# Patient Record
Sex: MALE | Race: WHITE | Employment: FULL TIME | URBAN - METROPOLITAN AREA
[De-identification: names, ages, dates, MRNs, and addresses within clinical notes are randomized per-mention and may not be internally consistent; named-entity substitution may affect disease eponyms.]

---

## 2018-06-26 ENCOUNTER — APPOINTMENT (OUTPATIENT)
Dept: LAB | Facility: REFERENCE LAB | Age: 33
End: 2018-06-26
Attending: FAMILY MEDICINE
Payer: COMMERCIAL

## 2018-06-26 ENCOUNTER — OFFICE VISIT (OUTPATIENT)
Dept: FAMILY MEDICINE CLINIC | Facility: CLINIC | Age: 33
End: 2018-06-26

## 2018-06-26 VITALS
SYSTOLIC BLOOD PRESSURE: 132 MMHG | OXYGEN SATURATION: 98 % | DIASTOLIC BLOOD PRESSURE: 86 MMHG | BODY MASS INDEX: 33.18 KG/M2 | WEIGHT: 245 LBS | HEIGHT: 72 IN | HEART RATE: 72 BPM

## 2018-06-26 DIAGNOSIS — Z00.01 ENCOUNTER FOR ROUTINE ADULT HEALTH EXAMINATION WITH ABNORMAL FINDINGS: Primary | ICD-10-CM

## 2018-06-26 DIAGNOSIS — Z00.01 ENCOUNTER FOR ROUTINE ADULT HEALTH EXAMINATION WITH ABNORMAL FINDINGS: ICD-10-CM

## 2018-06-26 DIAGNOSIS — I10 ESSENTIAL HYPERTENSION: ICD-10-CM

## 2018-06-26 DIAGNOSIS — G25.81 RESTLESS LEG SYNDROME: ICD-10-CM

## 2018-06-26 DIAGNOSIS — Z23 NEED FOR VACCINATION: ICD-10-CM

## 2018-06-26 DIAGNOSIS — F41.9 ANXIETY: ICD-10-CM

## 2018-06-26 PROCEDURE — 80053 COMPREHEN METABOLIC PANEL: CPT | Performed by: FAMILY MEDICINE

## 2018-06-26 PROCEDURE — 99385 PREV VISIT NEW AGE 18-39: CPT | Performed by: FAMILY MEDICINE

## 2018-06-26 RX ORDER — LISINOPRIL 20 MG/1
20 TABLET ORAL DAILY
Qty: 90 TABLET | Refills: 0 | Status: SHIPPED | OUTPATIENT
Start: 2018-06-26 | End: 2018-07-31

## 2018-06-26 RX ORDER — LISINOPRIL 20 MG/1
20 TABLET ORAL DAILY
Qty: 90 TABLET | Refills: 0 | Status: SHIPPED | OUTPATIENT
Start: 2018-06-26 | End: 2018-06-26

## 2018-06-26 NOTE — PATIENT INSTRUCTIONS
Eating Heart-Healthy Foods  Eating has a big impact on your heart health. In fact, eating healthier can improve several of your heart risks at once. For instance, it helps you manage weight, cholesterol, and blood pressure.  Here are ideas to help you mahendra Aim to make these foods staples of your diet. If you have diabetes, you may have different recommendations than what is listed here:  · Fruits and vegetables provide plenty of nutrients without a lot of calories.  At meals, fill half your plate with these f · Choose ingredients that spice up your food without adding calories, fat, or sodium. Try these items: horseradish, hot sauce, lemon, mustard, nonfat salad dressings, and vinegar.  For salt-free herbs and spices, try basil, cilantro, cinnamon, pepper, and r Vision All men in this age group Every 5 to 8 years if no risk factors for eye disease   Vaccines Who needs it How often   Chickenpox (varicella) All men in this age group who have no record of this infection or vaccine 2 doses; the second dose should be Use of tobacco and the health effects it can cause All men in this age group Every visit   Sexually transmitted infection prevention Men who are sexually active At routine exams   Skin cancer Prevention of skin cancer in fair-skinned adults through age 25

## 2018-06-26 NOTE — PROGRESS NOTES
Arya Ram is a 27year old male who presents for a complete physical exam.   HPI:     For the past couple of weeks he has taken his Lisinopril only 3-4 times and notes he would like to try to wean off of it.    Started blood pressure medication a yea pertinent surgical history.    Family History   Problem Relation Age of Onset   • Hypertension Father       Social History:  Smoking status: Former Smoker                                                              Packs/day: 0.00      Years: 0.00 examination with abnormal findings  (primary encounter diagnosis)  Essential hypertension  Restless leg syndrome  Anxiety  Need for vaccination    Orders Placed This Encounter      Comp Metabolic Panel (14) [E]      Lipid Panel [E]      CBC W Differential BP follow-up, 1 year for annual physical or sooner as needed.      Chad Lung, DO  06/26/18   12:48 PM

## 2018-07-31 ENCOUNTER — OFFICE VISIT (OUTPATIENT)
Dept: FAMILY MEDICINE CLINIC | Facility: CLINIC | Age: 33
End: 2018-07-31
Payer: COMMERCIAL

## 2018-07-31 ENCOUNTER — APPOINTMENT (OUTPATIENT)
Dept: LAB | Facility: REFERENCE LAB | Age: 33
End: 2018-07-31
Attending: FAMILY MEDICINE
Payer: COMMERCIAL

## 2018-07-31 VITALS
HEIGHT: 72 IN | DIASTOLIC BLOOD PRESSURE: 72 MMHG | WEIGHT: 246 LBS | SYSTOLIC BLOOD PRESSURE: 120 MMHG | OXYGEN SATURATION: 98 % | HEART RATE: 64 BPM | BODY MASS INDEX: 33.32 KG/M2

## 2018-07-31 DIAGNOSIS — R06.83 SNORING: ICD-10-CM

## 2018-07-31 DIAGNOSIS — E78.2 MIXED HYPERLIPIDEMIA: ICD-10-CM

## 2018-07-31 DIAGNOSIS — I10 ESSENTIAL HYPERTENSION: Primary | ICD-10-CM

## 2018-07-31 LAB
CHOLEST SERPL-MCNC: 247 MG/DL (ref 110–200)
HDLC SERPL-MCNC: 44 MG/DL
LDLC SERPL CALC-MCNC: 184 MG/DL (ref 0–99)
NONHDLC SERPL-MCNC: 203 MG/DL
TRIGL SERPL-MCNC: 93 MG/DL (ref 1–149)

## 2018-07-31 PROCEDURE — 80061 LIPID PANEL: CPT | Performed by: FAMILY MEDICINE

## 2018-07-31 PROCEDURE — 36415 COLL VENOUS BLD VENIPUNCTURE: CPT | Performed by: FAMILY MEDICINE

## 2018-07-31 PROCEDURE — 99213 OFFICE O/P EST LOW 20 MIN: CPT | Performed by: FAMILY MEDICINE

## 2018-07-31 RX ORDER — LISINOPRIL 20 MG/1
10 TABLET ORAL DAILY
Qty: 45 TABLET | Refills: 0 | COMMUNITY
Start: 2018-07-31 | End: 2018-12-04 | Stop reason: DRUGHIGH

## 2018-07-31 NOTE — PROGRESS NOTES
CC:  Patient presents with: Follow - Up: on blood pressure      HPI: 27year old male here to follow-up on blood pressure and HLD. Has taken Lisinopril 20 mg daily for the past month. Trying to eat more salads and eating leaner meats like chicken.   Monica Readings from Last 6 Encounters:  07/31/18 : 246 lb  06/26/18 : 245 lb  07/22/16 : 264 lb 6 oz  06/16/16 : 272 lb  08/26/08 : 269 lb 12.8 oz  06/26/07 : 256 lb      Body mass index is 33.36 kg/m².     Physical:  General:  Alert, appropriate, no acute distre

## 2018-11-20 RX ORDER — LISINOPRIL 20 MG/1
TABLET ORAL
Qty: 90 TABLET | Refills: 0 | OUTPATIENT
Start: 2018-11-20

## 2018-11-20 RX ORDER — LISINOPRIL 10 MG/1
10 TABLET ORAL DAILY
Qty: 30 TABLET | Refills: 0 | Status: SHIPPED | OUTPATIENT
Start: 2018-11-20 | End: 2018-12-04

## 2018-11-20 NOTE — TELEPHONE ENCOUNTER
Per pt, he continues to take Lisinopril 10mg daily. [facility] in [facility] [place] as pt has moved and scheduled pt for f/u appt o 12/4/18.

## 2018-12-04 ENCOUNTER — APPOINTMENT (OUTPATIENT)
Dept: LAB | Facility: REFERENCE LAB | Age: 33
End: 2018-12-04
Attending: FAMILY MEDICINE
Payer: COMMERCIAL

## 2018-12-04 ENCOUNTER — OFFICE VISIT (OUTPATIENT)
Dept: FAMILY MEDICINE CLINIC | Facility: CLINIC | Age: 33
End: 2018-12-04
Payer: COMMERCIAL

## 2018-12-04 VITALS
HEART RATE: 70 BPM | BODY MASS INDEX: 31.69 KG/M2 | DIASTOLIC BLOOD PRESSURE: 64 MMHG | HEIGHT: 72 IN | WEIGHT: 234 LBS | SYSTOLIC BLOOD PRESSURE: 120 MMHG | OXYGEN SATURATION: 98 %

## 2018-12-04 DIAGNOSIS — I10 ESSENTIAL HYPERTENSION: ICD-10-CM

## 2018-12-04 DIAGNOSIS — E78.2 MIXED HYPERLIPIDEMIA: Primary | ICD-10-CM

## 2018-12-04 DIAGNOSIS — Z23 NEED FOR VACCINATION: ICD-10-CM

## 2018-12-04 DIAGNOSIS — E78.2 MIXED HYPERLIPIDEMIA: ICD-10-CM

## 2018-12-04 PROCEDURE — 80061 LIPID PANEL: CPT

## 2018-12-04 PROCEDURE — 99214 OFFICE O/P EST MOD 30 MIN: CPT | Performed by: FAMILY MEDICINE

## 2018-12-04 PROCEDURE — 90686 IIV4 VACC NO PRSV 0.5 ML IM: CPT | Performed by: FAMILY MEDICINE

## 2018-12-04 PROCEDURE — 36415 COLL VENOUS BLD VENIPUNCTURE: CPT

## 2018-12-04 PROCEDURE — 90471 IMMUNIZATION ADMIN: CPT | Performed by: FAMILY MEDICINE

## 2018-12-04 RX ORDER — LISINOPRIL 10 MG/1
10 TABLET ORAL DAILY
Qty: 90 TABLET | Refills: 1 | Status: SHIPPED | OUTPATIENT
Start: 2018-12-04 | End: 2019-07-15

## 2018-12-04 NOTE — PROGRESS NOTES
CC:  Patient presents with: Follow - Up: on blood pressure  and cholesterol      HPI: 26year old male here to follow-up on BP and HLD. Has continued to eat smaller portions to help with the weight loss.   Checks his blood pressure occasionally at home an Substance and Sexual Activity      Alcohol use:  Yes        Alcohol/week: 0.0 oz        Comment: ocassionally       Drug use: No      Sexual activity: Not on file    Other Topics      Concerns:        Caffeine Concern: No        Exercise: Yes        Seat B vaccination    - FLULAVAL INFLUENZA VACCINE QUAD PRESERVATIVE FREE 0.5 ML      Bonita Molina, DO  12/04/18  9:18 AM

## 2019-01-09 ENCOUNTER — APPOINTMENT (OUTPATIENT)
Dept: LAB | Facility: REFERENCE LAB | Age: 34
End: 2019-01-09
Attending: FAMILY MEDICINE
Payer: COMMERCIAL

## 2019-01-09 DIAGNOSIS — E78.2 MIXED HYPERLIPIDEMIA: ICD-10-CM

## 2019-01-09 LAB
CHOLEST SERPL-MCNC: 219 MG/DL (ref 110–200)
HDLC SERPL-MCNC: 40 MG/DL
LDLC SERPL CALC-MCNC: 163 MG/DL (ref 0–99)
NONHDLC SERPL-MCNC: 179 MG/DL
TRIGL SERPL-MCNC: 79 MG/DL (ref 1–149)

## 2019-01-09 PROCEDURE — 36415 COLL VENOUS BLD VENIPUNCTURE: CPT

## 2019-01-09 PROCEDURE — 80061 LIPID PANEL: CPT

## 2019-04-19 ENCOUNTER — HOSPITAL ENCOUNTER (OUTPATIENT)
Age: 34
Discharge: HOME | End: 2019-04-19
Payer: COMMERCIAL

## 2019-04-19 VITALS
WEIGHT: 240 LBS | BODY MASS INDEX: 32.51 KG/M2 | OXYGEN SATURATION: 98 % | RESPIRATION RATE: 20 BRPM | HEIGHT: 72 IN | SYSTOLIC BLOOD PRESSURE: 126 MMHG | HEART RATE: 82 BPM | DIASTOLIC BLOOD PRESSURE: 77 MMHG | TEMPERATURE: 98 F

## 2019-04-19 DIAGNOSIS — J02.0 STREPTOCOCCAL SORE THROAT: Primary | ICD-10-CM

## 2019-04-19 PROCEDURE — 99213 OFFICE O/P EST LOW 20 MIN: CPT

## 2019-04-19 PROCEDURE — 99204 OFFICE O/P NEW MOD 45 MIN: CPT

## 2019-04-19 PROCEDURE — 87430 STREP A AG IA: CPT

## 2019-04-19 RX ORDER — AMOXICILLIN 875 MG/1
875 TABLET, COATED ORAL 2 TIMES DAILY
Qty: 20 TABLET | Refills: 0 | Status: SHIPPED | OUTPATIENT
Start: 2019-04-19 | End: 2019-04-29

## 2019-04-19 NOTE — ED PROVIDER NOTES
Patient presents with:  Sore Throat      HPI:     Suni العراقي is a 26year old male who presents for evaluation of a chief complaint of sore throat, dry cough, chills, and body aches for the past 2 days. No difficulty swallowing. Speech is clear.   M[place] Relationship status: Not on file      Intimate partner violence:        Fear of current or ex partner: Not on file        Emotionally abused: Not on file        Physically abused: Not on file        Forced sexual activity: Not on file    Other Topics care and follow-up closely with his primary care doctor. Diagnosis:    ICD-10-CM    1. Streptococcal sore throat J02.0        All results reviewed and discussed with patient. See AVS for detailed discharge instructions for your condition today.     Dori Kehr

## 2019-04-19 NOTE — ED INITIAL ASSESSMENT (HPI)
PATIENT ARRIVED AMBULATORY TO ROOM C/O SYMPTOMS X2 DAYS. +SORE THROAT. +CONGESTED COUGH. NO FEVERS. NO N/V/D. EASY NON LABORED RESPIRATIONS.

## 2019-07-15 RX ORDER — LISINOPRIL 10 MG/1
TABLET ORAL
Qty: 90 TABLET | Refills: 1 | Status: SHIPPED | OUTPATIENT
Start: 2019-07-15 | End: 2020-01-07

## 2020-01-07 ENCOUNTER — LAB ENCOUNTER (OUTPATIENT)
Dept: LAB | Facility: REFERENCE LAB | Age: 35
End: 2020-01-07
Attending: FAMILY MEDICINE
Payer: COMMERCIAL

## 2020-01-07 ENCOUNTER — OFFICE VISIT (OUTPATIENT)
Dept: FAMILY MEDICINE CLINIC | Facility: CLINIC | Age: 35
End: 2020-01-07
Payer: COMMERCIAL

## 2020-01-07 VITALS
DIASTOLIC BLOOD PRESSURE: 80 MMHG | SYSTOLIC BLOOD PRESSURE: 122 MMHG | OXYGEN SATURATION: 98 % | HEART RATE: 84 BPM | WEIGHT: 250 LBS | BODY MASS INDEX: 33.86 KG/M2 | HEIGHT: 72 IN

## 2020-01-07 DIAGNOSIS — Z00.01 ENCOUNTER FOR ROUTINE ADULT HEALTH EXAMINATION WITH ABNORMAL FINDINGS: Primary | ICD-10-CM

## 2020-01-07 DIAGNOSIS — I10 ESSENTIAL HYPERTENSION: ICD-10-CM

## 2020-01-07 DIAGNOSIS — H93.8X2 EAR PRESSURE, LEFT: ICD-10-CM

## 2020-01-07 DIAGNOSIS — Z23 NEED FOR VACCINATION: ICD-10-CM

## 2020-01-07 DIAGNOSIS — Z00.01 ENCOUNTER FOR ROUTINE ADULT HEALTH EXAMINATION WITH ABNORMAL FINDINGS: ICD-10-CM

## 2020-01-07 DIAGNOSIS — E78.2 MIXED HYPERLIPIDEMIA: ICD-10-CM

## 2020-01-07 LAB
ALBUMIN SERPL-MCNC: 3.8 G/DL (ref 3.4–5)
ALBUMIN/GLOB SERPL: 1 {RATIO} (ref 1–2)
ALP LIVER SERPL-CCNC: 66 U/L (ref 45–117)
ALT SERPL-CCNC: 34 U/L (ref 16–61)
ANION GAP SERPL CALC-SCNC: 5 MMOL/L (ref 0–18)
AST SERPL-CCNC: 27 U/L (ref 15–37)
BASOPHILS # BLD AUTO: 0.06 X10(3) UL (ref 0–0.2)
BASOPHILS NFR BLD AUTO: 1.1 %
BILIRUB SERPL-MCNC: 0.3 MG/DL (ref 0.1–2)
BUN BLD-MCNC: 24 MG/DL (ref 7–18)
BUN/CREAT SERPL: 17.5 (ref 10–20)
CALCIUM BLD-MCNC: 8.9 MG/DL (ref 8.5–10.1)
CHLORIDE SERPL-SCNC: 112 MMOL/L (ref 98–112)
CHOLEST SMN-MCNC: 225 MG/DL (ref ?–200)
CO2 SERPL-SCNC: 25 MMOL/L (ref 21–32)
CREAT BLD-MCNC: 1.37 MG/DL (ref 0.7–1.3)
DEPRECATED RDW RBC AUTO: 42.5 FL (ref 35.1–46.3)
EOSINOPHIL # BLD AUTO: 0.44 X10(3) UL (ref 0–0.7)
EOSINOPHIL NFR BLD AUTO: 7.7 %
ERYTHROCYTE [DISTWIDTH] IN BLOOD BY AUTOMATED COUNT: 12.6 % (ref 11–15)
EST. AVERAGE GLUCOSE BLD GHB EST-MCNC: 108 MG/DL (ref 68–126)
GLOBULIN PLAS-MCNC: 3.7 G/DL (ref 2.8–4.4)
GLUCOSE BLD-MCNC: 96 MG/DL (ref 70–99)
HBA1C MFR BLD HPLC: 5.4 % (ref ?–5.7)
HCT VFR BLD AUTO: 41 % (ref 39–53)
HDLC SERPL-MCNC: 40 MG/DL (ref 40–59)
HGB BLD-MCNC: 14.3 G/DL (ref 13–17.5)
IMM GRANULOCYTES # BLD AUTO: 0.02 X10(3) UL (ref 0–1)
IMM GRANULOCYTES NFR BLD: 0.4 %
LDLC SERPL CALC-MCNC: 172 MG/DL (ref ?–100)
LYMPHOCYTES # BLD AUTO: 1.89 X10(3) UL (ref 1–4)
LYMPHOCYTES NFR BLD AUTO: 33.2 %
M PROTEIN MFR SERPL ELPH: 7.5 G/DL (ref 6.4–8.2)
MCH RBC QN AUTO: 32 PG (ref 26–34)
MCHC RBC AUTO-ENTMCNC: 34.9 G/DL (ref 31–37)
MCV RBC AUTO: 91.7 FL (ref 80–100)
MONOCYTES # BLD AUTO: 0.47 X10(3) UL (ref 0.1–1)
MONOCYTES NFR BLD AUTO: 8.2 %
NEUTROPHILS # BLD AUTO: 2.82 X10 (3) UL (ref 1.5–7.7)
NEUTROPHILS # BLD AUTO: 2.82 X10(3) UL (ref 1.5–7.7)
NEUTROPHILS NFR BLD AUTO: 49.4 %
NONHDLC SERPL-MCNC: 185 MG/DL (ref ?–130)
OSMOLALITY SERPL CALC.SUM OF ELEC: 298 MOSM/KG (ref 275–295)
PATIENT FASTING Y/N/NP: YES
PATIENT FASTING Y/N/NP: YES
PLATELET # BLD AUTO: 262 10(3)UL (ref 150–450)
POTASSIUM SERPL-SCNC: 4.4 MMOL/L (ref 3.5–5.1)
RBC # BLD AUTO: 4.47 X10(6)UL (ref 4.3–5.7)
SODIUM SERPL-SCNC: 142 MMOL/L (ref 136–145)
TRIGL SERPL-MCNC: 65 MG/DL (ref 30–149)
VLDLC SERPL CALC-MCNC: 13 MG/DL (ref 0–30)
WBC # BLD AUTO: 5.7 X10(3) UL (ref 4–11)

## 2020-01-07 PROCEDURE — 99213 OFFICE O/P EST LOW 20 MIN: CPT | Performed by: FAMILY MEDICINE

## 2020-01-07 PROCEDURE — 80061 LIPID PANEL: CPT

## 2020-01-07 PROCEDURE — 36415 COLL VENOUS BLD VENIPUNCTURE: CPT

## 2020-01-07 PROCEDURE — 99395 PREV VISIT EST AGE 18-39: CPT | Performed by: FAMILY MEDICINE

## 2020-01-07 PROCEDURE — 85025 COMPLETE CBC W/AUTO DIFF WBC: CPT

## 2020-01-07 PROCEDURE — 90686 IIV4 VACC NO PRSV 0.5 ML IM: CPT | Performed by: FAMILY MEDICINE

## 2020-01-07 PROCEDURE — 80053 COMPREHEN METABOLIC PANEL: CPT

## 2020-01-07 PROCEDURE — 83036 HEMOGLOBIN GLYCOSYLATED A1C: CPT

## 2020-01-07 PROCEDURE — 90471 IMMUNIZATION ADMIN: CPT | Performed by: FAMILY MEDICINE

## 2020-01-07 RX ORDER — LISINOPRIL 10 MG/1
10 TABLET ORAL
Qty: 90 TABLET | Refills: 1 | Status: SHIPPED | OUTPATIENT
Start: 2020-01-07 | End: 2020-10-08

## 2020-01-07 NOTE — PATIENT INSTRUCTIONS
-Consider Omega 3 supplement 1,000 mg 1-2 times a day or red yeast rice extract to help lower cholesterol      Prevention Guidelines, Men Ages 25 to 44  Screening tests and vaccines are an important part of managing your health.  A screening test is done to provider   Vision All men in this age group Every 11 to 8 years if no risk factors for eye disease   Vaccines Who needs it How often   Chickenpox (varicella) All men in this age group who have no record of this infection or vaccine 2 doses; the second dose effects it can cause All men in this age group Every visit   Sexually transmitted infection prevention Men who are sexually active At routine exams   Skin cancer Prevention of skin cancer in fair-skinned adults through age 25 At routine exams   1Those who (lamb, ham, beef)  · Many pastries, cakes, cookies, and candies  · Cream, ice cream, sour cream, cheese, and butter, and foods made with them  · Sauces made with butter or cream  · Salad dressings with saturated fats  · Foods that contain palm or coconut o food labels helps you make healthy choices. Look for the words highlighted below. · Serving Size. This is the amount of food in 1 serving. If you eat larger portions, be sure to count more of everything: fat, calories, and cholesterol.   · Total Fat. Tells

## 2020-01-07 NOTE — PROGRESS NOTES
Bobby Garcia is a 37year old male who presents for a complete physical exam.   HPI:   Patient presents with:   Follow - Up: cholesterol and blood pressure  Ear Problem: feels pressure on his left ear when he bites down    Taking Lisinopril most days of Diagnosis Date   • Essential hypertension    • History of high cholesterol    • Metabolic disorder    • Prediabetes       History reviewed. No pertinent surgical history.    Family History   Problem Relation Age of Onset   • Hypertension Father       Soci Value   08/26/2008 39     AST (U/L)   Date Value   06/26/2018 27   06/17/2016 23     ALT (SGPT) (IU/L)   Date Value   08/26/2008 71 (H)     ALT (U/L)   Date Value   06/26/2018 26   06/17/2016 43      ASSESSMENT AND PLAN:   Kathy Aguirre is a 37year old Refills for this Visit:  Requested Prescriptions     Signed Prescriptions Disp Refills   • lisinopril 10 MG Oral Tab 90 tablet 1     Sig: Take 1 tablet (10 mg total) by mouth once daily.        Imaging & Consults:  FLULAVAL INFLUENZA VACCINE QUAD PRESERVATI

## 2020-06-18 ENCOUNTER — APPOINTMENT (OUTPATIENT)
Dept: LAB | Age: 35
End: 2020-06-18
Attending: FAMILY MEDICINE
Payer: COMMERCIAL

## 2020-06-18 DIAGNOSIS — E78.2 MIXED HYPERLIPIDEMIA: ICD-10-CM

## 2020-06-18 PROCEDURE — 36415 COLL VENOUS BLD VENIPUNCTURE: CPT

## 2020-06-18 PROCEDURE — 80061 LIPID PANEL: CPT

## 2020-07-28 ENCOUNTER — HOSPITAL ENCOUNTER (OUTPATIENT)
Age: 35
Discharge: HOME | End: 2020-07-28
Payer: COMMERCIAL

## 2020-07-28 VITALS
TEMPERATURE: 98 F | RESPIRATION RATE: 16 BRPM | SYSTOLIC BLOOD PRESSURE: 124 MMHG | DIASTOLIC BLOOD PRESSURE: 81 MMHG | BODY MASS INDEX: 33.46 KG/M2 | HEIGHT: 72 IN | OXYGEN SATURATION: 100 % | WEIGHT: 247 LBS | HEART RATE: 78 BPM

## 2020-07-28 DIAGNOSIS — Z20.822 SUSPECTED COVID-19 VIRUS INFECTION: Primary | ICD-10-CM

## 2020-07-28 LAB — S PYO AG THROAT QL: NEGATIVE

## 2020-07-28 PROCEDURE — 87430 STREP A AG IA: CPT | Performed by: PHYSICIAN ASSISTANT

## 2020-07-28 PROCEDURE — 99202 OFFICE O/P NEW SF 15 MIN: CPT | Performed by: PHYSICIAN ASSISTANT

## 2020-07-28 PROCEDURE — U0003 INFECTIOUS AGENT DETECTION BY NUCLEIC ACID (DNA OR RNA); SEVERE ACUTE RESPIRATORY SYNDROME CORONAVIRUS 2 (SARS-COV-2) (CORONAVIRUS DISEASE [COVID-19]), AMPLIFIED PROBE TECHNIQUE, MAKING USE OF HIGH THROUGHPUT TECHNOLOGIES AS DESCRIBED BY CMS-2020-01-R: HCPCS | Performed by: PHYSICIAN ASSISTANT

## 2020-07-28 NOTE — ED PROVIDER NOTES
Patient Seen in: [address]      History   Patient presents with:  Sore Throat  Cough/URI  Testing    Stated Complaint: sore throat, cough    HPI    38 yo male with PMH of HTN here for sore throat, congestion, intermitte present. No oropharyngeal exudate. Eyes:      Extraocular Movements: Extraocular movements intact. Pupils: Pupils are equal, round, and reactive to light. Neck:      Musculoskeletal: Normal range of motion.    Cardiovascular:      Rate and Rhythm:

## 2020-07-28 NOTE — ED INITIAL ASSESSMENT (HPI)
Patient states having sore throat, chest congestion, dry cough, increased fatigue x several days./  Patient denies fever, denies n/v/d.

## 2020-07-30 ENCOUNTER — APPOINTMENT (OUTPATIENT)
Dept: GENERAL RADIOLOGY | Age: 35
End: 2020-07-30
Attending: PHYSICIAN ASSISTANT
Payer: COMMERCIAL

## 2020-07-30 ENCOUNTER — HOSPITAL ENCOUNTER (OUTPATIENT)
Age: 35
Discharge: HOME | End: 2020-07-30
Payer: COMMERCIAL

## 2020-07-30 VITALS
DIASTOLIC BLOOD PRESSURE: 81 MMHG | BODY MASS INDEX: 30.71 KG/M2 | WEIGHT: 247 LBS | TEMPERATURE: 98 F | OXYGEN SATURATION: 100 % | HEIGHT: 75 IN | RESPIRATION RATE: 16 BRPM | SYSTOLIC BLOOD PRESSURE: 145 MMHG | HEART RATE: 71 BPM

## 2020-07-30 DIAGNOSIS — R06.02 SOB (SHORTNESS OF BREATH): Primary | ICD-10-CM

## 2020-07-30 LAB — SARS-COV-2 RNA RESP QL NAA+PROBE: NOT DETECTED

## 2020-07-30 PROCEDURE — 71046 X-RAY EXAM CHEST 2 VIEWS: CPT | Performed by: PHYSICIAN ASSISTANT

## 2020-07-30 PROCEDURE — 99213 OFFICE O/P EST LOW 20 MIN: CPT | Performed by: PHYSICIAN ASSISTANT

## 2020-07-30 NOTE — ED NOTES
Pt refused repeat Covid-19 test, fearful of insurance not paying for the test when he has not received the results yet from the first test.

## 2020-07-30 NOTE — ED PROVIDER NOTES
Patient Seen in: [address]      History   Patient presents with:  Dyspnea ANA SOB    Stated Complaint: testing    HPI    36 yo male with PMH of HTN here for evaluation of SOB, cough.   Pt was seen 2 days ago for similar Mouth/Throat:      Mouth: Mucous membranes are moist.   Eyes:      Extraocular Movements: Extraocular movements intact. Pupils: Pupils are equal, round, and reactive to light. Neck:      Musculoskeletal: Normal range of motion.    Cardiovascular: spondylosis. OTHER: Negative.                          =====    CONCLUSION:     1.  No acute cardiopulmonary disease                   Dictated by (CST): Harvey Yip MD on 7/30/2020 at 3:58 PM         Finalized by (CST): Harvey Yip,

## 2020-07-30 NOTE — ED INITIAL ASSESSMENT (HPI)
Pt states that he has had a cough and shortness of breath for 2 days along with a sore throat. Pt was seen in Immediate Care 2 days ago when symptoms started and had a Covid-19 test done and is still awaiting results.  Pt says his sore throat has resolved,

## 2020-10-08 ENCOUNTER — TELEPHONE (OUTPATIENT)
Dept: FAMILY MEDICINE CLINIC | Facility: CLINIC | Age: 35
End: 2020-10-08

## 2020-10-08 RX ORDER — LISINOPRIL 10 MG/1
10 TABLET ORAL
Qty: 90 TABLET | Refills: 0 | Status: SHIPPED | OUTPATIENT
Start: 2020-10-08 | End: 2020-11-09

## 2020-10-08 NOTE — TELEPHONE ENCOUNTER
Med Rf after confirming with pt and pt scheduled with [facility] on 11/9/2020 for a BP f/u. Pt states BPs have been normal again (refer to mychart msg from 9/22/2020). Pt verbalized understanding and agrees with POC.      LOV with AS was 1/7/2020: Return in about 3

## 2020-10-08 NOTE — TELEPHONE ENCOUNTER
Patient requesting an refill on Lisinopril will like to be sent to [facility] in [place]  at [address]   Tel: [phone]

## 2020-10-31 ENCOUNTER — HOSPITAL ENCOUNTER (EMERGENCY)
Age: 35
Discharge: HOME | End: 2020-10-31
Attending: EMERGENCY MEDICINE
Payer: COMMERCIAL

## 2020-10-31 VITALS
TEMPERATURE: 97 F | WEIGHT: 250 LBS | HEART RATE: 77 BPM | BODY MASS INDEX: 33.86 KG/M2 | HEIGHT: 72 IN | OXYGEN SATURATION: 97 % | SYSTOLIC BLOOD PRESSURE: 117 MMHG | RESPIRATION RATE: 16 BRPM | DIASTOLIC BLOOD PRESSURE: 83 MMHG

## 2020-10-31 DIAGNOSIS — Z20.822 EXPOSURE TO COVID-19 VIRUS: Primary | ICD-10-CM

## 2020-10-31 PROCEDURE — 99283 EMERGENCY DEPT VISIT LOW MDM: CPT

## 2020-10-31 NOTE — ED PROVIDER NOTES
Patient Seen in: [address] [facility] In [place]      History   Patient presents with:  Covid    Stated Complaint: sore throat cough here for covid testing    CHRIS Gomez is a pleasant 24-year-old male presenting to the emergency department for PCR ()                  MDM      The patient has a Covid sample collected and patient was given quarantining instructions                   Disposition and Plan     Clinical Impression:  Exposure to COVID-19 virus  (primary encounter diagnosis)    Dis

## 2020-10-31 NOTE — ED INITIAL ASSESSMENT (HPI)
REPORTS SORE THROAT THAT STARTED THIS AM, COUGH STARTED YESTERDAY.   REPORTS RECENT EXPOSURE AT WORK

## 2020-11-09 ENCOUNTER — OFFICE VISIT (OUTPATIENT)
Dept: FAMILY MEDICINE CLINIC | Facility: CLINIC | Age: 35
End: 2020-11-09
Payer: COMMERCIAL

## 2020-11-09 VITALS
DIASTOLIC BLOOD PRESSURE: 88 MMHG | BODY MASS INDEX: 33.59 KG/M2 | HEIGHT: 72 IN | SYSTOLIC BLOOD PRESSURE: 120 MMHG | OXYGEN SATURATION: 98 % | HEART RATE: 82 BPM | WEIGHT: 248 LBS

## 2020-11-09 DIAGNOSIS — E78.2 MIXED HYPERLIPIDEMIA: ICD-10-CM

## 2020-11-09 DIAGNOSIS — I10 ESSENTIAL HYPERTENSION: Primary | ICD-10-CM

## 2020-11-09 DIAGNOSIS — F41.9 ANXIETY: ICD-10-CM

## 2020-11-09 DIAGNOSIS — N52.9 ERECTILE DYSFUNCTION, UNSPECIFIED ERECTILE DYSFUNCTION TYPE: ICD-10-CM

## 2020-11-09 PROCEDURE — 3079F DIAST BP 80-89 MM HG: CPT | Performed by: FAMILY MEDICINE

## 2020-11-09 PROCEDURE — 99214 OFFICE O/P EST MOD 30 MIN: CPT | Performed by: FAMILY MEDICINE

## 2020-11-09 PROCEDURE — 99072 ADDL SUPL MATRL&STAF TM PHE: CPT | Performed by: FAMILY MEDICINE

## 2020-11-09 PROCEDURE — 3008F BODY MASS INDEX DOCD: CPT | Performed by: FAMILY MEDICINE

## 2020-11-09 PROCEDURE — 3074F SYST BP LT 130 MM HG: CPT | Performed by: FAMILY MEDICINE

## 2020-11-09 RX ORDER — LISINOPRIL 10 MG/1
10 TABLET ORAL
Qty: 90 TABLET | Refills: 0 | Status: SHIPPED | OUTPATIENT
Start: 2020-11-09 | End: 2021-01-04

## 2020-11-09 RX ORDER — VENLAFAXINE HYDROCHLORIDE 37.5 MG/1
CAPSULE, EXTENDED RELEASE ORAL
Qty: 90 CAPSULE | Refills: 0 | Status: SHIPPED | OUTPATIENT
Start: 2020-11-09 | End: 2020-12-17

## 2020-11-09 NOTE — PROGRESS NOTES
CC:  Patient presents with:  Hypertension  Follow - Up      HPI: 38year old male here to follow-up on hypertension and with other concerns. Has had a lot of anxiety related to Covid-19. Does also have a lack of motivation but denies feeling depressed. file        Non-medical: Not on file    Tobacco Use      Smoking status: Current Some Day Smoker        Types: Cigarettes      Smokeless tobacco: Never Used    Substance and Sexual Activity      Alcohol use: Yes        Frequency: 2-3 times a week         your work, take care of things at home, or get along with other people? Somewhat difficult     From the Primary Care Evaluation of Mental Disorders Patient Health Questionnaire (PRIME-MD PHQ). The PHQ was developed by Kalin GARCIA of erectile dysfunction on an SSRI  - Start Venlafaxine ER 37.5 mg daily x 1 week and then increase to 75 mg daily  - Follow-up in about 4-5 weeks via video visit or sooner if concerns     4.  Mixed hyperlipidemia    - Repeat lipid panel and if still elevat

## 2020-11-09 NOTE — PATIENT INSTRUCTIONS
Treating Anxiety Disorders with Medicine  An anxiety disorder can make you feel nervous or apprehensive, even without a clear reason.  In people age 72 and older, generalized anxiety disorder is one of the most commonly diagnosed anxiety disorders. Many t Never use alcohol or other drugs with anti-anxiety medicines. This could result in loss of muscular control, sedation, coma, or death. Also, use only the amount of medicine prescribed for you.  If you think you may have taken too much, get emergency care ri · Addiction. If Alva Ranks never had a problem with drugs or alcohol, you may not have a problem with medicines used to treat anxiety disorders. But always discuss the medicines with your healthcare provider before taking them.  If you have a history of addicti

## 2020-11-14 ENCOUNTER — LAB ENCOUNTER (OUTPATIENT)
Dept: LAB | Age: 35
End: 2020-11-14
Attending: FAMILY MEDICINE
Payer: COMMERCIAL

## 2020-11-14 DIAGNOSIS — E78.2 MIXED HYPERLIPIDEMIA: ICD-10-CM

## 2020-11-14 DIAGNOSIS — N52.9 ERECTILE DYSFUNCTION, UNSPECIFIED ERECTILE DYSFUNCTION TYPE: ICD-10-CM

## 2020-11-14 PROCEDURE — 84443 ASSAY THYROID STIM HORMONE: CPT | Performed by: FAMILY MEDICINE

## 2020-11-14 PROCEDURE — 36415 COLL VENOUS BLD VENIPUNCTURE: CPT | Performed by: FAMILY MEDICINE

## 2020-11-14 PROCEDURE — 80061 LIPID PANEL: CPT | Performed by: FAMILY MEDICINE

## 2020-12-17 RX ORDER — VENLAFAXINE HYDROCHLORIDE 37.5 MG/1
75 CAPSULE, EXTENDED RELEASE ORAL DAILY
Qty: 90 CAPSULE | Refills: 0 | Status: SHIPPED | OUTPATIENT
Start: 2020-12-17 | End: 2020-12-27

## 2020-12-28 RX ORDER — VENLAFAXINE HYDROCHLORIDE 37.5 MG/1
75 CAPSULE, EXTENDED RELEASE ORAL DAILY
Qty: 90 CAPSULE | Refills: 0 | Status: SHIPPED | OUTPATIENT
Start: 2020-12-28 | End: 2021-02-03

## 2020-12-28 NOTE — TELEPHONE ENCOUNTER
A refill request was received for:  Requested Prescriptions     Pending Prescriptions Disp Refills   • Venlafaxine HCl ER 37.5 MG Oral Capsule SR 24 Hr 90 capsule 0     Sig: Take 2 capsules (75 mg total) by mouth daily.      Last refill date: 12/17/20  Qty:

## 2020-12-29 ENCOUNTER — OFFICE VISIT (OUTPATIENT)
Dept: FAMILY MEDICINE CLINIC | Facility: CLINIC | Age: 35
End: 2020-12-29
Payer: COMMERCIAL

## 2020-12-29 VITALS
DIASTOLIC BLOOD PRESSURE: 90 MMHG | SYSTOLIC BLOOD PRESSURE: 134 MMHG | HEART RATE: 70 BPM | BODY MASS INDEX: 32.51 KG/M2 | TEMPERATURE: 99 F | RESPIRATION RATE: 16 BRPM | OXYGEN SATURATION: 100 % | HEIGHT: 72 IN | WEIGHT: 240 LBS

## 2020-12-29 DIAGNOSIS — J02.0 STREP PHARYNGITIS: ICD-10-CM

## 2020-12-29 DIAGNOSIS — Z20.822 EXPOSURE TO COVID-19 VIRUS: Primary | ICD-10-CM

## 2020-12-29 DIAGNOSIS — Z20.822 SUSPECTED COVID-19 VIRUS INFECTION: ICD-10-CM

## 2020-12-29 DIAGNOSIS — Z72.0 TOBACCO USE: ICD-10-CM

## 2020-12-29 DIAGNOSIS — J02.9 SORE THROAT: ICD-10-CM

## 2020-12-29 PROCEDURE — 3080F DIAST BP >= 90 MM HG: CPT | Performed by: NURSE PRACTITIONER

## 2020-12-29 PROCEDURE — 99213 OFFICE O/P EST LOW 20 MIN: CPT | Performed by: NURSE PRACTITIONER

## 2020-12-29 PROCEDURE — 3008F BODY MASS INDEX DOCD: CPT | Performed by: NURSE PRACTITIONER

## 2020-12-29 PROCEDURE — 87880 STREP A ASSAY W/OPTIC: CPT | Performed by: NURSE PRACTITIONER

## 2020-12-29 PROCEDURE — 3075F SYST BP GE 130 - 139MM HG: CPT | Performed by: NURSE PRACTITIONER

## 2020-12-29 RX ORDER — AMOXICILLIN 875 MG/1
875 TABLET, COATED ORAL 2 TIMES DAILY
Qty: 20 TABLET | Refills: 0 | Status: SHIPPED | OUTPATIENT
Start: 2020-12-29 | End: 2021-01-08

## 2020-12-29 NOTE — PATIENT INSTRUCTIONS
Regardless of the test results you are ill. You should practice social distancing which means stay about 6 feet from people at all times, cover your cough, wash hands frequently, stay home away from others, and sanitize surfaces often.  Rest and stay hydrat [facility] is here to provide community members reliable answers to any questions they may have. Please review the entirety of this informational document.   It includes information related to exposure, pending tests, positive results, afterc immediately self-isolate and contact your local public health authority or healthcare provider. • Wear a mask, stay at least 6 feet from others, wash your hands, avoid crowds, and take other steps to prevent the spread of COVID-19.   [facility] continues to [employer], you should contact your health care provider before seeking further care. Process measures to keep everyone safe in this difficult time are changing frequently. Your healthcare provider can help direct you on next steps.     If you have not bee Program  Jb Parker, in conjunction with Pauly Gage., is looking for patients who have recovered from COVID-19 and would be interested in donating plasma.     Convalescent plasma is a component of blood that, in people who have recovered from SallykeExchange.nl. pdf  TAXI5.pl.Foodem.cy  http://www.[facility].[place].gov/topics-services/diseases-and-conditions/dise

## 2020-12-29 NOTE — PROGRESS NOTES
Apoorva Taveras is a 38year old male who presents with ill symptoms for  1  days. Patient reports sore throat, congestion, dry cough. Has tried nothing for relief. Possible Covid exposure with wife exposed 2 days ago but not ill.  Concerned for covid test LUNGS: clear to auscultation  CARDIO: RRR without murmur  Results for orders placed or performed in visit on 12/29/20   STREP A ASSAY W/OPTIC    Collection Time: 12/29/20  9:58 AM   Result Value Ref Range    Strep Grp A Screen POS Negative    Control Line Seek immediate care for worsening or concerning symptoms. Follow up with your primary care doctor if not improving. · Please start Amoxicillin as discussed. Finish all the medication even if you feel better.   · Probiotics or yogurt taken daily will hel Anyone who has been in close contact with someone who has COVID-19 should quarantine at home for 14 days from the time of exposure and follow the below recommendations.   If you test positive for COVID-19, you should notify your family and friends with whom CDC continues to endorse quarantine for 14 days and recognizes that any quarantine shorter than 14 days balances reduced burden against a small possibility of spreading the virus. 10 Ways to Manage Your Health at Home      1.  Stay home from work, school If you have not been exposed or are not aware of an exposure to COVID-19 and are concerned about your symptoms, please contact your health care provider with any questions.     Home Isolation  If you have tested positive for COVID-19, you should remain unde Convalescent plasma is a component of blood that, in people who have recovered from COVID-19, contains antibodies against the virus.  The antibodies in plasma can be used as a treatment for patients in our community who are most severely affected by the vir

## 2021-01-04 RX ORDER — LISINOPRIL 10 MG/1
TABLET ORAL
Qty: 90 TABLET | Refills: 0 | Status: SHIPPED | OUTPATIENT
Start: 2021-01-04 | End: 2021-03-09

## 2021-01-13 NOTE — PROGRESS NOTES
Virtual Telephone Check-In    [address] [place] [address] verbally consents to a Virtual/Telephone Check-In visit on 01/13/21. Patient has been referred to the [facility] website at [email or web address] to review the yearly Consent to Treat document.     Patient unders daily and will continue to take it with CoQ10 to prevent myalgias. Return for CMP and lipid panel in the next few weeks.       Macrk Banda, DO

## 2021-02-03 RX ORDER — VENLAFAXINE HYDROCHLORIDE 37.5 MG/1
37.5 CAPSULE, EXTENDED RELEASE ORAL DAILY
Qty: 90 CAPSULE | Refills: 0 | Status: SHIPPED | OUTPATIENT
Start: 2021-02-03 | End: 2021-04-30

## 2021-02-03 NOTE — TELEPHONE ENCOUNTER
A refill request was received for:  Requested Prescriptions     Pending Prescriptions Disp Refills   • VENLAFAXINE HCL ER 37.5 MG Oral Capsule SR 24 Hr [Pharmacy Med Name: VENLAFAXINE HCL ER 37.5 MG CAP] 90 capsule 0     Sig: TAKE 2 CAPSULES (75 MG TOTAL)

## 2021-02-11 ENCOUNTER — PATIENT MESSAGE (OUTPATIENT)
Dept: FAMILY MEDICINE CLINIC | Facility: CLINIC | Age: 36
End: 2021-02-11

## 2021-02-11 RX ORDER — ROSUVASTATIN CALCIUM 10 MG/1
10 TABLET, COATED ORAL NIGHTLY
Qty: 90 TABLET | Refills: 0 | Status: SHIPPED | OUTPATIENT
Start: 2021-02-11 | End: 2021-05-10

## 2021-02-11 NOTE — TELEPHONE ENCOUNTER
Medication requested: Rosuvastatin  Pt last appt/annual: 1/13/21 \"Also doing well on Crestor 10 mg daily and will continue to take it with CoQ10 to prevent myalgias. Return for CMP and lipid panel in the next few weeks\".    RTC recommendation: Return in a

## 2021-02-12 NOTE — TELEPHONE ENCOUNTER
Separate message sent letting him know he could try nicotine patches without any concern for interactions with his other medication.

## 2021-02-21 ENCOUNTER — OFFICE VISIT (OUTPATIENT)
Dept: FAMILY MEDICINE CLINIC | Facility: CLINIC | Age: 36
End: 2021-02-21
Payer: COMMERCIAL

## 2021-02-21 VITALS
HEIGHT: 72 IN | RESPIRATION RATE: 20 BRPM | BODY MASS INDEX: 31.83 KG/M2 | HEART RATE: 79 BPM | SYSTOLIC BLOOD PRESSURE: 136 MMHG | WEIGHT: 235 LBS | TEMPERATURE: 96 F | OXYGEN SATURATION: 98 % | DIASTOLIC BLOOD PRESSURE: 82 MMHG

## 2021-02-21 DIAGNOSIS — J06.9 VIRAL UPPER RESPIRATORY TRACT INFECTION: Primary | ICD-10-CM

## 2021-02-21 PROCEDURE — 3079F DIAST BP 80-89 MM HG: CPT | Performed by: NURSE PRACTITIONER

## 2021-02-21 PROCEDURE — 99213 OFFICE O/P EST LOW 20 MIN: CPT | Performed by: NURSE PRACTITIONER

## 2021-02-21 PROCEDURE — 3008F BODY MASS INDEX DOCD: CPT | Performed by: NURSE PRACTITIONER

## 2021-02-21 PROCEDURE — 3075F SYST BP GE 130 - 139MM HG: CPT | Performed by: NURSE PRACTITIONER

## 2021-02-21 RX ORDER — ALBUTEROL SULFATE 90 UG/1
2 AEROSOL, METERED RESPIRATORY (INHALATION) EVERY 4 HOURS PRN
Qty: 1 INHALER | Refills: 6 | Status: SHIPPED | OUTPATIENT
Start: 2021-02-21

## 2021-02-21 NOTE — PATIENT INSTRUCTIONS
Use Mucinex DM for cough  Use inhaler as needed for cough    You are being tested for Covid 19 today.      If the Covid test is positive then self isolate for 10 days from the test date  If the Covid test is not positive then self isolate for 10    Coronavi 1. Stay home from work, school, and away from other public places. If you must go out, avoid using any kind of public transportation, ridesharing, or taxis. 2. Monitor your symptoms carefully.  If your symptoms get worse, call your healthcare provider imm If you have tested positive for COVID-19, you should remain under home isolation precautions following the below guidelines:  ? At least 24 hours have passed since recovery defined as resolution of fever without the use of fever-reducing medications; and If you would be interested in donating your plasma to help treat others diagnosed with the virus, please contact Teri directly on their website: [email or web address]    Who is eligible to donate convalescent plasma?

## 2021-02-21 NOTE — PROGRESS NOTES
CHIEF COMPLAINT:   Patient presents with:  Upper Respiratory Infection      HPI:      Pt presents with a cough for 2 weeks. Pt reports this morning he woke with severe sinus congestion. Pt denies fever and loss of taste/smell.  Pt has not used OTC medicatio HEAD: atraumatic, normocephalic.  no tenderness on palpation of sinuses  EYES: conjunctiva clear, EOM intact  EARS: TM's intact  NOSE: Nostrils patent, white nasal discharge, nasal mucosa red and swollen   THROAT: Oral mucosa pink, moist. Posterior pharynx [facility] is committed to the safety and well-being of our patients, members, employees, and communities.  As concerns arise about the new strain of coronavirus that causes COVID-19, [facility] is here to provide community members r 4. If you have a medical appointment, call the healthcare provider ahead of time and tell them that you have or may have COVID-19.  5. For medical emergencies, call 911 and notify the dispatch personnel that you have or may have COVID-19.   6. Cover your c · At least 10 days have passed since symptoms first appeared OR if asymptomatic patient or date of symptom onset is unclear then use 10 days post COVID test date.    · At least 20 days have passed for severe illness (requiring hospitalization) OR if you are *Some people will be required to have a repeat COVID-19 test in order to be eligible to donate. If you’re instructed by Christina Barnes that a repeat test is required, please contact the [facility] COVID-19 Nurse Triage Line at [phone].     Additional Inf

## 2021-02-22 LAB — SARS-COV-2 RNA RESP QL NAA+PROBE: NOT DETECTED

## 2021-02-26 NOTE — PROGRESS NOTES
CC:  Patient presents with:  Medication Follow-Up      HPI: 38year old male presenting for video visit to follow-up on medication for anxiety. Reports it has been a crazy last two weeks so he did not get his blood work done yet.   About to finish the scho week        Drinks per session: 1 or 2        Comment: ocassionally       Drug use: No      Sexual activity: Yes        Partners: Female    Lifestyle      Physical activity        Days per week: Not on file        Minutes per session: Not on file      Stre Meet Saravia, Madeline Tyson and colleagues. For research information, contact Dr. Guille Coffey at [email or web address]. PRIME-MD® is a trademark of [address]. All rights reserved. Reproduced with permission.       Current sufficient and adequate time. This billing was spent on reviewing labs, medications, radiology tests and decision making. Appropriate medical decision-making and tests are ordered as detailed in the plan of care above.       Kenney Benson DO  02/26/21  10:13

## 2021-03-09 RX ORDER — LISINOPRIL 10 MG/1
10 TABLET ORAL DAILY
Qty: 90 TABLET | Refills: 1 | Status: SHIPPED | OUTPATIENT
Start: 2021-03-09 | End: 2021-05-24

## 2021-03-09 NOTE — TELEPHONE ENCOUNTER
A refill request was received for:  Requested Prescriptions     Pending Prescriptions Disp Refills   • LISINOPRIL 10 MG Oral Tab [Pharmacy Med Name: LISINOPRIL 10 MG TABLET] 90 tablet 0     Sig: TAKE 1 TABLET BY MOUTH EVERY DAY     Last refill date: 01/04/

## 2021-04-30 RX ORDER — VENLAFAXINE HYDROCHLORIDE 37.5 MG/1
CAPSULE, EXTENDED RELEASE ORAL
Qty: 90 CAPSULE | Refills: 0 | Status: SHIPPED | OUTPATIENT
Start: 2021-04-30 | End: 2021-08-31

## 2021-04-30 NOTE — TELEPHONE ENCOUNTER
A refill request was received for:  Requested Prescriptions     Pending Prescriptions Disp Refills   • VENLAFAXINE HCL ER 37.5 MG Oral Capsule SR 24 Hr [Pharmacy Med Name: VENLAFAXINE HCL ER 37.5 MG CAP] 90 capsule 0     Sig: TAKE 1 CAPSULE BY MOUTH EVERY

## 2021-05-10 RX ORDER — ROSUVASTATIN CALCIUM 10 MG/1
10 TABLET, COATED ORAL NIGHTLY
Qty: 90 TABLET | Refills: 0 | Status: SHIPPED | OUTPATIENT
Start: 2021-05-10 | End: 2021-08-02

## 2021-05-13 ENCOUNTER — OFFICE VISIT (OUTPATIENT)
Dept: FAMILY MEDICINE CLINIC | Facility: CLINIC | Age: 36
End: 2021-05-13
Payer: COMMERCIAL

## 2021-05-13 VITALS
OXYGEN SATURATION: 99 % | TEMPERATURE: 98 F | HEART RATE: 80 BPM | WEIGHT: 260 LBS | RESPIRATION RATE: 18 BRPM | HEIGHT: 72 IN | DIASTOLIC BLOOD PRESSURE: 70 MMHG | SYSTOLIC BLOOD PRESSURE: 138 MMHG | BODY MASS INDEX: 35.21 KG/M2

## 2021-05-13 DIAGNOSIS — Z20.822 SUSPECTED COVID-19 VIRUS INFECTION: Primary | ICD-10-CM

## 2021-05-13 PROCEDURE — 3075F SYST BP GE 130 - 139MM HG: CPT | Performed by: NURSE PRACTITIONER

## 2021-05-13 PROCEDURE — 99213 OFFICE O/P EST LOW 20 MIN: CPT | Performed by: NURSE PRACTITIONER

## 2021-05-13 PROCEDURE — 3008F BODY MASS INDEX DOCD: CPT | Performed by: NURSE PRACTITIONER

## 2021-05-13 PROCEDURE — 3078F DIAST BP <80 MM HG: CPT | Performed by: NURSE PRACTITIONER

## 2021-05-13 NOTE — PATIENT INSTRUCTIONS
Use Albuterol inhaler every 4-6 hours as needed    Viral Upper Respiratory Illness with Wheezing (Adult)    You have a viral upper respiratory illness (URI), which is another term for the common cold.  When the infection causes a lot of irritation, the ai length of time you’re sick, but they may be helpful for the following symptoms: cough, sore throat, and nasal and sinus congestion. Ask your healthcare provider or pharmacist which over-the-counter medicine to use.  Don't use decongestants if you have high

## 2021-05-13 NOTE — PROGRESS NOTES
CHIEF COMPLAINT:   Patient presents with:  Covid: Cough, wheezing when laying down, tristin runny nose - Entered by patient x 2 days      HPI:   Jatinder Tom is a 38year old male who presents for upper respiratory symptoms for  2 days.  Patient reports c rashes,no suspicious lesions  HEAD: atraumatic, normocephalic.  no tenderness on palpation of maxillary sinuses  EYES: conjunctiva clear, EOM intact  EARS: TM's without erythema, no bulging, noretraction,no fluid, bony landmarks visible  NOSE: Nostrils pat

## 2021-05-24 RX ORDER — LISINOPRIL 10 MG/1
TABLET ORAL
Qty: 90 TABLET | Refills: 1 | Status: SHIPPED | OUTPATIENT
Start: 2021-05-24 | End: 2021-11-04

## 2021-05-24 NOTE — TELEPHONE ENCOUNTER
A refill request was received for:  Requested Prescriptions     Pending Prescriptions Disp Refills   • LISINOPRIL 10 MG Oral Tab [Pharmacy Med Name: LISINOPRIL 10 MG TABLET] 90 tablet 1     Sig: TAKE 1 TABLET BY MOUTH EVERY DAY     Last refill date: 03/09/

## 2021-07-24 ENCOUNTER — OFFICE VISIT (OUTPATIENT)
Dept: FAMILY MEDICINE CLINIC | Facility: CLINIC | Age: 36
End: 2021-07-24
Payer: COMMERCIAL

## 2021-07-24 VITALS
HEART RATE: 106 BPM | HEIGHT: 72 IN | WEIGHT: 270 LBS | DIASTOLIC BLOOD PRESSURE: 90 MMHG | OXYGEN SATURATION: 98 % | BODY MASS INDEX: 36.57 KG/M2 | SYSTOLIC BLOOD PRESSURE: 130 MMHG | TEMPERATURE: 99 F

## 2021-07-24 DIAGNOSIS — J06.9 UPPER RESPIRATORY TRACT INFECTION, UNSPECIFIED TYPE: Primary | ICD-10-CM

## 2021-07-24 DIAGNOSIS — J02.9 SORE THROAT: ICD-10-CM

## 2021-07-24 LAB
CONTROL LINE PRESENT WITH A CLEAR BACKGROUND (YES/NO): YES YES/NO
KIT LOT #: NORMAL NUMERIC

## 2021-07-24 PROCEDURE — 3075F SYST BP GE 130 - 139MM HG: CPT | Performed by: PHYSICIAN ASSISTANT

## 2021-07-24 PROCEDURE — 99213 OFFICE O/P EST LOW 20 MIN: CPT | Performed by: PHYSICIAN ASSISTANT

## 2021-07-24 PROCEDURE — 3080F DIAST BP >= 90 MM HG: CPT | Performed by: PHYSICIAN ASSISTANT

## 2021-07-24 PROCEDURE — 87081 CULTURE SCREEN ONLY: CPT | Performed by: PHYSICIAN ASSISTANT

## 2021-07-24 PROCEDURE — 87880 STREP A ASSAY W/OPTIC: CPT | Performed by: PHYSICIAN ASSISTANT

## 2021-07-24 PROCEDURE — 3008F BODY MASS INDEX DOCD: CPT | Performed by: PHYSICIAN ASSISTANT

## 2021-07-24 RX ORDER — PREDNISONE 20 MG/1
TABLET ORAL
Qty: 10 TABLET | Refills: 0 | Status: SHIPPED | OUTPATIENT
Start: 2021-07-24 | End: 2021-08-07

## 2021-07-24 RX ORDER — BENZONATATE 200 MG/1
200 CAPSULE ORAL 3 TIMES DAILY PRN
Qty: 21 CAPSULE | Refills: 0 | Status: SHIPPED | OUTPATIENT
Start: 2021-07-24 | End: 2021-07-31

## 2021-07-24 NOTE — PATIENT INSTRUCTIONS
Patient Declined AVS    Verbal Instructions given      1. Prednisone  2. At home albuterol  3. Quit smoking  4. Tessalon  5. Throat culture and COVID pending  6. Follow up with PCP  7.  If worsening symptoms seek treatment

## 2021-07-24 NOTE — PROGRESS NOTES
CHIEF COMPLAINT:     Patient presents with:  Sore Throat      HPI:   Juan Ramon Baker is a 38year old male who presents with complaints of feeling sick for 2 days. Symptoms include sore throat, nasal congestion, cough, and wheezing.   The patient is a dayo ulcers.   EYES: Denies blurred vision or double vision  HENT: See HPI  CHEST: Denies chest pain, or palpitations  LUNGS: See HPI  GI: Denies abdominal pain, N/V/C/D.   MUSCULOSKELETAL: no arthralgia or swollen joints  LYMPH:  Denies lymphadenopathy  NEURO: smoking  4. Tessalon  5. Throat culture and COVID pending  6. Follow up with PCP  7.  If worsening symptoms seek treatment

## 2021-07-26 LAB — SARS-COV-2 RNA RESP QL NAA+PROBE: NOT DETECTED

## 2021-08-02 RX ORDER — ROSUVASTATIN CALCIUM 10 MG/1
10 TABLET, COATED ORAL NIGHTLY
Qty: 90 TABLET | Refills: 0 | Status: SHIPPED | OUTPATIENT
Start: 2021-08-02 | End: 2021-10-29

## 2021-08-07 ENCOUNTER — OFFICE VISIT (OUTPATIENT)
Dept: FAMILY MEDICINE CLINIC | Facility: CLINIC | Age: 36
End: 2021-08-07
Payer: COMMERCIAL

## 2021-08-07 VITALS
WEIGHT: 269 LBS | HEART RATE: 82 BPM | SYSTOLIC BLOOD PRESSURE: 144 MMHG | RESPIRATION RATE: 18 BRPM | DIASTOLIC BLOOD PRESSURE: 90 MMHG | OXYGEN SATURATION: 98 % | BODY MASS INDEX: 36.44 KG/M2 | TEMPERATURE: 98 F | HEIGHT: 72 IN

## 2021-08-07 DIAGNOSIS — J02.9 SORE THROAT: ICD-10-CM

## 2021-08-07 DIAGNOSIS — J01.00 ACUTE NON-RECURRENT MAXILLARY SINUSITIS: Primary | ICD-10-CM

## 2021-08-07 DIAGNOSIS — R05.9 COUGH: ICD-10-CM

## 2021-08-07 DIAGNOSIS — Z20.822 ENCOUNTER FOR LABORATORY TESTING FOR COVID-19 VIRUS: ICD-10-CM

## 2021-08-07 LAB
CONTROL LINE PRESENT WITH A CLEAR BACKGROUND (YES/NO): YES YES/NO
KIT LOT #: NORMAL NUMERIC
OPERATOR ID: NORMAL
POCT LOT NUMBER: NORMAL
RAPID SARS-COV-2 BY PCR: NOT DETECTED
STREP GRP A CUL-SCR: NEGATIVE

## 2021-08-07 PROCEDURE — 3008F BODY MASS INDEX DOCD: CPT | Performed by: NURSE PRACTITIONER

## 2021-08-07 PROCEDURE — U0002 COVID-19 LAB TEST NON-CDC: HCPCS | Performed by: NURSE PRACTITIONER

## 2021-08-07 PROCEDURE — 3080F DIAST BP >= 90 MM HG: CPT | Performed by: NURSE PRACTITIONER

## 2021-08-07 PROCEDURE — 3077F SYST BP >= 140 MM HG: CPT | Performed by: NURSE PRACTITIONER

## 2021-08-07 PROCEDURE — 87880 STREP A ASSAY W/OPTIC: CPT | Performed by: NURSE PRACTITIONER

## 2021-08-07 PROCEDURE — 99213 OFFICE O/P EST LOW 20 MIN: CPT | Performed by: NURSE PRACTITIONER

## 2021-08-07 RX ORDER — AMOXICILLIN AND CLAVULANATE POTASSIUM 875; 125 MG/1; MG/1
1 TABLET, FILM COATED ORAL 2 TIMES DAILY
Qty: 20 TABLET | Refills: 0 | Status: SHIPPED | OUTPATIENT
Start: 2021-08-07 | End: 2021-08-17

## 2021-08-07 NOTE — PROGRESS NOTES
CHIEF COMPLAINT:   Patient presents with:  Sinus Problem  Sore Throat  Cough      HPI:   Talisha Oliver is a 38year old male who presents for covid-19 testing.  Patient reports intermittent sinus congestion/pressure and yellow drainage and sore throat x Alcohol use: Yes      Comment: ocassionally     Drug use: No        REVIEW OF SYSTEMS:   GENERAL: Denies fevers.  Notes good appetite  SKIN: no rashes or abnormal skin lesions  HEENT: + sore throat, + sinus symptoms, Denies ear pain  LUNGS: + nonproductive Discussed physical exam and hpi with pt. Pt has reassuring physical exam consistent with sinusitis and pharyngitis. No signs of pta or retropharyngeal infection. Lungs cta bilat. No respiratory distress noted.  We discussed viral vs allergy vs bacterial misa Don't stop taking them, even when you feel better. · Drink plenty of water, hot tea, and other liquids as directed by the healthcare provider. This may help thin nasal mucus. It also may help your sinuses drain fluids.   · Heat may help soothe painful area healthcare provider before using these medicines. Never give aspirin to anyone under age 25 who is ill with a fever. It may cause severe liver damage. · Don't smoke. This can make symptoms worse.     Follow-up care  Follow up with your healthcare provider, bacteria and help fight infection. The throat (pharynx) is the passage for air. Mucus from the nasal cavity also moves down the passage. An inflamed throat  The tonsils and pharynx can become inflamed due to a cold or flu virus.  Postnasal drip (excess mu the symptoms, rest, and let the problem heal itself. Antibiotics may help clear up some bacterial infections. For cases of severe or recurring tonsillitis, the tonsils may need to be removed.    Relieving your symptoms  · Don’t smoke, and stay away from sec year. “Severe” episodes include those that lead to missed days of school or work, or that need to be treated with antibiotics.   · Tonsillitis that causes breathing problems during sleep  · Tonsillitis caused by food particles collecting in pouches in the t

## 2021-08-07 NOTE — PATIENT INSTRUCTIONS
1. Rest. Drink plenty of fluids. 2. Augmentin as prescribed. 3. Supportive care as discussed. 4. Nasal rinses as directed. Use humidifier at home when possible.   5. Follow up with PMD in 3-4 days for reeval. Go to the emergency department immediately oxymetazoline. First blow your nose gently. Then use the spray. Don't use these medicines more often than directed on the label. If you do, your symptoms may get worse. You may also take pills that contain pseudoephedrine.  Don’t use products that combine m take to help prevent an infection:   · Keep good hand washing habits. · Don’t have close contact with people who have sore throats, colds, or other upper respiratory infections. · Don’t smoke, and stay away from secondhand smoke.   · Stay up to date with and how have you been treating it? · Do you have any other symptoms, such as body aches, fever, or cough? · Does your sore throat recur? If so, how often? How many days of school or work have you missed because of a sore throat?   · Do you have trouble ea age17. Are antibiotics needed? If your sore throat is due to a bacterial infection, antibiotics may speed healing and prevent complications.  Although group A streptococcus (strep throat) is the major treatable infection for a sore throat, strep throat any of the following occur:   · Trouble breathing or problems catching your breath may be a medical emergency.   · Skin is blue, purple or gray in color  · Trouble talking  · Feeling dizzy or faint  · Feeling of doom  [employer] last reviewed this educational

## 2021-08-31 RX ORDER — VENLAFAXINE HYDROCHLORIDE 37.5 MG/1
CAPSULE, EXTENDED RELEASE ORAL
Qty: 90 CAPSULE | Refills: 0 | Status: SHIPPED | OUTPATIENT
Start: 2021-08-31 | End: 2021-11-29

## 2021-08-31 NOTE — TELEPHONE ENCOUNTER
A refill request was received for:  Requested Prescriptions     Pending Prescriptions Disp Refills   • VENLAFAXINE 37.5 MG Oral Capsule SR 24 Hr [Pharmacy Med Name: VENLAFAXINE HCL ER 37.5 MG CAP] 90 capsule 0     Sig: TAKE 1 CAPSULE BY MOUTH EVERY DAY

## 2021-10-29 RX ORDER — ROSUVASTATIN CALCIUM 10 MG/1
TABLET, COATED ORAL
Qty: 90 TABLET | Refills: 0 | Status: SHIPPED | OUTPATIENT
Start: 2021-10-29 | End: 2022-01-25

## 2021-10-29 NOTE — TELEPHONE ENCOUNTER
A refill request was received for:  Requested Prescriptions     Pending Prescriptions Disp Refills   • ROSUVASTATIN 10 MG Oral Tab [Pharmacy Med Name: ROSUVASTATIN CALCIUM 10 MG TAB] 90 tablet 0     Sig: TAKE 1 TABLET BY MOUTH EVERY DAY AT NIGHT     Last r

## 2021-11-04 RX ORDER — LISINOPRIL 10 MG/1
10 TABLET ORAL DAILY
Qty: 90 TABLET | Refills: 0 | Status: SHIPPED | OUTPATIENT
Start: 2021-11-04 | End: 2022-03-07

## 2021-11-29 RX ORDER — VENLAFAXINE HYDROCHLORIDE 37.5 MG/1
CAPSULE, EXTENDED RELEASE ORAL
Qty: 90 CAPSULE | Refills: 0 | Status: SHIPPED | OUTPATIENT
Start: 2021-11-29

## 2022-01-25 RX ORDER — ROSUVASTATIN CALCIUM 10 MG/1
TABLET, COATED ORAL
Qty: 90 TABLET | Refills: 0 | Status: SHIPPED | OUTPATIENT
Start: 2022-01-25

## 2022-02-10 ENCOUNTER — OFFICE VISIT (OUTPATIENT)
Dept: FAMILY MEDICINE CLINIC | Facility: CLINIC | Age: 37
End: 2022-02-10
Payer: COMMERCIAL

## 2022-02-10 VITALS
HEART RATE: 74 BPM | SYSTOLIC BLOOD PRESSURE: 124 MMHG | DIASTOLIC BLOOD PRESSURE: 80 MMHG | OXYGEN SATURATION: 99 % | HEIGHT: 72 IN | WEIGHT: 259 LBS | BODY MASS INDEX: 35.08 KG/M2

## 2022-02-10 DIAGNOSIS — F41.9 ANXIETY: ICD-10-CM

## 2022-02-10 DIAGNOSIS — E78.2 MIXED HYPERLIPIDEMIA: ICD-10-CM

## 2022-02-10 DIAGNOSIS — Z11.3 VENEREAL DISEASE SCREENING: ICD-10-CM

## 2022-02-10 DIAGNOSIS — Z23 NEED FOR VACCINATION: ICD-10-CM

## 2022-02-10 DIAGNOSIS — Z00.00 ENCOUNTER FOR ROUTINE ADULT HEALTH EXAMINATION WITHOUT ABNORMAL FINDINGS: Primary | ICD-10-CM

## 2022-02-10 PROCEDURE — 99395 PREV VISIT EST AGE 18-39: CPT | Performed by: FAMILY MEDICINE

## 2022-02-10 PROCEDURE — 3008F BODY MASS INDEX DOCD: CPT | Performed by: FAMILY MEDICINE

## 2022-02-10 PROCEDURE — 90686 IIV4 VACC NO PRSV 0.5 ML IM: CPT | Performed by: FAMILY MEDICINE

## 2022-02-10 PROCEDURE — 90471 IMMUNIZATION ADMIN: CPT | Performed by: FAMILY MEDICINE

## 2022-02-10 PROCEDURE — 3074F SYST BP LT 130 MM HG: CPT | Performed by: FAMILY MEDICINE

## 2022-02-10 PROCEDURE — 99213 OFFICE O/P EST LOW 20 MIN: CPT | Performed by: FAMILY MEDICINE

## 2022-02-10 PROCEDURE — 3079F DIAST BP 80-89 MM HG: CPT | Performed by: FAMILY MEDICINE

## 2022-02-10 PROCEDURE — 90732 PPSV23 VACC 2 YRS+ SUBQ/IM: CPT | Performed by: FAMILY MEDICINE

## 2022-02-10 PROCEDURE — 90472 IMMUNIZATION ADMIN EACH ADD: CPT | Performed by: FAMILY MEDICINE

## 2022-02-10 RX ORDER — VENLAFAXINE HYDROCHLORIDE 37.5 MG/1
37.5 CAPSULE, EXTENDED RELEASE ORAL EVERY OTHER DAY
Qty: 90 CAPSULE | Refills: 0 | COMMUNITY
Start: 2022-02-10 | End: 2022-02-23

## 2022-02-12 ENCOUNTER — LABORATORY ENCOUNTER (OUTPATIENT)
Dept: LAB | Age: 37
End: 2022-02-12
Attending: FAMILY MEDICINE
Payer: COMMERCIAL

## 2022-02-12 DIAGNOSIS — Z11.3 VENEREAL DISEASE SCREENING: ICD-10-CM

## 2022-02-12 DIAGNOSIS — Z00.00 ENCOUNTER FOR ROUTINE ADULT HEALTH EXAMINATION WITHOUT ABNORMAL FINDINGS: ICD-10-CM

## 2022-02-12 LAB
ALBUMIN SERPL-MCNC: 4.3 G/DL (ref 3.4–5)
ALBUMIN/GLOB SERPL: 1.2 {RATIO} (ref 1–2)
ALP LIVER SERPL-CCNC: 98 U/L
ALT SERPL-CCNC: 29 U/L
ANION GAP SERPL CALC-SCNC: 4 MMOL/L (ref 0–18)
AST SERPL-CCNC: 18 U/L (ref 15–37)
BASOPHILS # BLD AUTO: 0.06 X10(3) UL (ref 0–0.2)
BASOPHILS NFR BLD AUTO: 0.6 %
BILIRUB SERPL-MCNC: 0.6 MG/DL (ref 0.1–2)
BUN BLD-MCNC: 15 MG/DL (ref 7–18)
CALCIUM BLD-MCNC: 9.5 MG/DL (ref 8.5–10.1)
CHLORIDE SERPL-SCNC: 108 MMOL/L (ref 98–112)
CHOLEST SERPL-MCNC: 159 MG/DL (ref ?–200)
CO2 SERPL-SCNC: 27 MMOL/L (ref 21–32)
CREAT BLD-MCNC: 1.37 MG/DL
CREAT UR-SCNC: 336 MG/DL
EOSINOPHIL # BLD AUTO: 0.65 X10(3) UL (ref 0–0.7)
EOSINOPHIL NFR BLD AUTO: 6.4 %
ERYTHROCYTE [DISTWIDTH] IN BLOOD BY AUTOMATED COUNT: 13.1 %
EST. AVERAGE GLUCOSE BLD GHB EST-MCNC: 111 MG/DL (ref 68–126)
FASTING PATIENT LIPID ANSWER: YES
FASTING STATUS PATIENT QL REPORTED: YES
GLOBULIN PLAS-MCNC: 3.5 G/DL (ref 2.8–4.4)
GLUCOSE BLD-MCNC: 99 MG/DL (ref 70–99)
HBA1C MFR BLD: 5.5 % (ref ?–5.7)
HCT VFR BLD AUTO: 45.6 %
HCV AB SERPL QL IA: NONREACTIVE
HDLC SERPL-MCNC: 40 MG/DL (ref 40–59)
HGB BLD-MCNC: 15.1 G/DL
IMM GRANULOCYTES # BLD AUTO: 0.02 X10(3) UL (ref 0–1)
IMM GRANULOCYTES NFR BLD: 0.2 %
LDLC SERPL CALC-MCNC: 99 MG/DL (ref ?–100)
LYMPHOCYTES # BLD AUTO: 1.68 X10(3) UL (ref 1–4)
LYMPHOCYTES NFR BLD AUTO: 16.7 %
MCH RBC QN AUTO: 30.9 PG (ref 26–34)
MCHC RBC AUTO-ENTMCNC: 33.1 G/DL (ref 31–37)
MCV RBC AUTO: 93.4 FL
MICROALBUMIN UR-MCNC: 1.57 MG/DL
MICROALBUMIN/CREAT 24H UR-RTO: 4.7 UG/MG (ref ?–30)
MONOCYTES # BLD AUTO: 0.57 X10(3) UL (ref 0.1–1)
MONOCYTES NFR BLD AUTO: 5.7 %
NEUTROPHILS # BLD AUTO: 7.1 X10 (3) UL (ref 1.5–7.7)
NEUTROPHILS # BLD AUTO: 7.1 X10(3) UL (ref 1.5–7.7)
NEUTROPHILS NFR BLD AUTO: 70.4 %
NONHDLC SERPL-MCNC: 119 MG/DL (ref ?–130)
OSMOLALITY SERPL CALC.SUM OF ELEC: 289 MOSM/KG (ref 275–295)
PLATELET # BLD AUTO: 326 10(3)UL (ref 150–450)
POTASSIUM SERPL-SCNC: 4.4 MMOL/L (ref 3.5–5.1)
PROT SERPL-MCNC: 7.8 G/DL (ref 6.4–8.2)
RBC # BLD AUTO: 4.88 X10(6)UL
SODIUM SERPL-SCNC: 139 MMOL/L (ref 136–145)
T PALLIDUM AB SER QL IA: NONREACTIVE
TRIGL SERPL-MCNC: 111 MG/DL (ref 30–149)
VLDLC SERPL CALC-MCNC: 18 MG/DL (ref 0–30)
WBC # BLD AUTO: 10.1 X10(3) UL (ref 4–11)

## 2022-02-12 PROCEDURE — 86780 TREPONEMA PALLIDUM: CPT | Performed by: FAMILY MEDICINE

## 2022-02-12 PROCEDURE — 87591 N.GONORRHOEAE DNA AMP PROB: CPT | Performed by: FAMILY MEDICINE

## 2022-02-12 PROCEDURE — 82043 UR ALBUMIN QUANTITATIVE: CPT | Performed by: FAMILY MEDICINE

## 2022-02-12 PROCEDURE — 86803 HEPATITIS C AB TEST: CPT | Performed by: FAMILY MEDICINE

## 2022-02-12 PROCEDURE — 80053 COMPREHEN METABOLIC PANEL: CPT | Performed by: FAMILY MEDICINE

## 2022-02-12 PROCEDURE — 82570 ASSAY OF URINE CREATININE: CPT | Performed by: FAMILY MEDICINE

## 2022-02-12 PROCEDURE — 87491 CHLMYD TRACH DNA AMP PROBE: CPT | Performed by: FAMILY MEDICINE

## 2022-02-12 PROCEDURE — 87389 HIV-1 AG W/HIV-1&-2 AB AG IA: CPT | Performed by: FAMILY MEDICINE

## 2022-02-12 PROCEDURE — 85025 COMPLETE CBC W/AUTO DIFF WBC: CPT | Performed by: FAMILY MEDICINE

## 2022-02-12 PROCEDURE — 83036 HEMOGLOBIN GLYCOSYLATED A1C: CPT | Performed by: FAMILY MEDICINE

## 2022-02-12 PROCEDURE — 80061 LIPID PANEL: CPT | Performed by: FAMILY MEDICINE

## 2022-02-14 LAB
C TRACH DNA SPEC QL NAA+PROBE: NEGATIVE
N GONORRHOEA DNA SPEC QL NAA+PROBE: NEGATIVE

## 2022-02-23 RX ORDER — VENLAFAXINE HYDROCHLORIDE 37.5 MG/1
CAPSULE, EXTENDED RELEASE ORAL
Qty: 90 CAPSULE | Refills: 0 | Status: SHIPPED | OUTPATIENT
Start: 2022-02-23

## 2022-03-08 RX ORDER — LISINOPRIL 10 MG/1
10 TABLET ORAL DAILY
Qty: 90 TABLET | Refills: 0 | Status: SHIPPED | OUTPATIENT
Start: 2022-03-08

## 2022-05-04 RX ORDER — LISINOPRIL 10 MG/1
10 TABLET ORAL DAILY
Qty: 90 TABLET | Refills: 0 | Status: SHIPPED | OUTPATIENT
Start: 2022-05-04 | End: 2022-06-15

## 2022-05-04 RX ORDER — ROSUVASTATIN CALCIUM 10 MG/1
10 TABLET, COATED ORAL NIGHTLY
Qty: 90 TABLET | Refills: 0 | Status: SHIPPED | OUTPATIENT
Start: 2022-05-04 | End: 2022-06-15

## 2022-05-04 NOTE — TELEPHONE ENCOUNTER
Refill passed per [facility] protocol. Requested Prescriptions   Pending Prescriptions Disp Refills    rosuvastatin 10 MG Oral Tab 90 tablet 0     Sig: Take 1 tablet (10 mg total) by mouth nightly. Cholesterol Medication Protocol Passed - 5/4/2022  3:23 PM        Passed - ALT in past 12 months        Passed - LDL in past 12 months        Passed - Last ALT < 80       Lab Results   Component Value Date    ALT 29 02/12/2022             Passed - Last LDL < 130     Lab Results   Component Value Date    LDL 99 02/12/2022               Passed - Appointment in past 12 or next 3 months           lisinopril 10 MG Oral Tab 90 tablet 0     Sig: Take 1 tablet (10 mg total) by mouth daily.         Hypertensive Medications Protocol Passed - 5/4/2022  3:23 PM        Passed - CMP or BMP in past 12 months        Passed - Appointment in past 6 or next 3 months        Passed - GFR Non- > 50     Lab Results   Component Value Date    GFRNAA 65 02/12/2022                       Recent Outpatient Visits              2 months ago Encounter for routine adult health examination without abnormal findings    [address], [place]    Office Visit    9 months ago Acute non-recurrent maxillary sinusitis    [facility] Anna Ashraf NP    Office Visit    9 months ago Upper respiratory tract infection, unspecified type    [facility]    Office Visit    11 months ago Suspected COVID-19 virus infection    [facility] Paul Mcneill NP    Office Visit    1 year [facility] [address] [facility] [address] [facility]

## 2022-06-16 RX ORDER — LISINOPRIL 10 MG/1
10 TABLET ORAL DAILY
Qty: 90 TABLET | Refills: 1 | Status: SHIPPED | OUTPATIENT
Start: 2022-06-16 | End: 2022-12-12

## 2022-06-16 RX ORDER — ROSUVASTATIN CALCIUM 10 MG/1
10 TABLET, COATED ORAL NIGHTLY
Qty: 90 TABLET | Refills: 1 | Status: SHIPPED | OUTPATIENT
Start: 2022-06-16 | End: 2022-12-12

## 2022-06-16 NOTE — TELEPHONE ENCOUNTER
Refill passed per [place] [facility] protocol. Requested Prescriptions   Pending Prescriptions Disp Refills    rosuvastatin 10 MG Oral Tab 90 tablet 0     Sig: Take 1 tablet (10 mg total) by mouth nightly. Cholesterol Medication Protocol Passed - 6/15/2022 10:41 PM        Passed - ALT in past 12 months        Passed - LDL in past 12 months        Passed - Last ALT < 80       Lab Results   Component Value Date    ALT 29 02/12/2022             Passed - Last LDL < 130     Lab Results   Component Value Date    LDL 99 02/12/2022               Passed - Appointment in past 12 or next 3 months           lisinopril 10 MG Oral Tab 90 tablet 0     Sig: Take 1 tablet (10 mg total) by mouth daily.         Hypertensive Medications Protocol Passed - 6/15/2022 10:41 PM        Passed - CMP or BMP in past 12 months        Passed - Appointment in past 6 or next 3 months        Passed - GFR Non- > 50     Lab Results   Component Value Date    GFRNAA 65 02/12/2022                       Recent Outpatient Visits              4 months ago Encounter for routine adult health examination without abnormal findings    [address], [place]    Office Visit    10 months ago Acute non-recurrent maxillary sinusitis    [facility] Lucero Baer NP    Office Visit    10 months ago Upper respiratory tract infection, unspecified type    [place], [place]    Office Visit    1 year ago Suspected COVID-19 virus infection    [facility] Alex Leon NP    Office Visit    1 year [facility] [address] [facility] [address] [facility]

## 2022-07-11 PROBLEM — F33.1 MODERATE EPISODE OF RECURRENT MAJOR DEPRESSIVE DISORDER (HCC): Status: ACTIVE | Noted: 2022-07-11

## 2022-10-05 ENCOUNTER — APPOINTMENT (OUTPATIENT)
Dept: URGENT CARE | Age: 37
End: 2022-10-05

## 2022-10-10 RX ORDER — ESCITALOPRAM OXALATE 10 MG/1
10 TABLET ORAL DAILY
Qty: 90 TABLET | Refills: 0 | Status: SHIPPED | OUTPATIENT
Start: 2022-10-10

## 2022-10-10 NOTE — TELEPHONE ENCOUNTER
Spoke with pt informed him that [place] Julio Cesar would like him to schedule a f/u visit pt will schedule via [facility]

## 2022-10-10 NOTE — TELEPHONE ENCOUNTER
Refill passed per [address] [facility] protocol. Please note sig change. Updated Rx.      Requested Prescriptions   Pending Prescriptions Disp Refills    ESCITALOPRAM 10 MG Oral Tab [Pharmacy Med Name: ESCITALOPRAM 10 MG TABLET] 30 tablet 0     Sig: TAKE 1/2 TABLET BY MOUTH DAILY FOR 7 DAYS THEN TAKE ONE TABLET BY MOUTH DAILY        Psychiatric Non-Scheduled (Anti-Anxiety) Passed - 10/9/2022  5:35 AM        Passed - In person appointment or virtual visit in the past 6 mos or appointment in next 3 mos       Recent Outpatient Visits              3 months ago [address] [facility], [address] [place] [address]    8 months ago Encounter for routine adult health examination without abnormal findings    [address], [place]    Office Visit    1 year ago Acute non-recurrent maxillary sinusitis    [facility] Beatris Nevarez, NP    Office Visit    1 year ago Upper respiratory tract infection, unspecified type    [facility] [place], [place]    Office Visit    1 year ago Suspected COVID-19 virus infection    [facility] Elidia Leon, NP    Office Visit                       Recent Outpatient Visits              3 months ago [address], [address] [facility], [facility]    8 months ago Encounter for routine adult health examination without abnormal findings    [address], [place]    Office Visit    1 year ago Acute non-recurrent maxillary sinusitis    [facility] Bob Andujar, NP    Office Visit    1 year ago Upper respiratory tract infection, unspecified type    [place], [place]    Office Visit    1 year ago Suspected COVID-19 virus infection    [facility] Paulina Solis, [place]    Office Visit

## 2022-11-14 ENCOUNTER — LAB ENCOUNTER (OUTPATIENT)
Dept: LAB | Age: 37
End: 2022-11-14
Attending: FAMILY MEDICINE
Payer: COMMERCIAL

## 2022-11-14 DIAGNOSIS — Z11.3 VENEREAL DISEASE SCREENING: ICD-10-CM

## 2022-11-14 PROCEDURE — 87389 HIV-1 AG W/HIV-1&-2 AB AG IA: CPT

## 2022-11-14 PROCEDURE — 87491 CHLMYD TRACH DNA AMP PROBE: CPT

## 2022-11-14 PROCEDURE — 86780 TREPONEMA PALLIDUM: CPT

## 2022-11-14 PROCEDURE — 87591 N.GONORRHOEAE DNA AMP PROB: CPT

## 2022-11-14 PROCEDURE — 36415 COLL VENOUS BLD VENIPUNCTURE: CPT

## 2022-11-15 LAB
C TRACH DNA SPEC QL NAA+PROBE: NEGATIVE
N GONORRHOEA DNA SPEC QL NAA+PROBE: NEGATIVE

## 2022-11-16 LAB — T PALLIDUM AB SER QL: NEGATIVE

## 2022-12-08 ENCOUNTER — HOSPITAL ENCOUNTER (OUTPATIENT)
Age: 37
Discharge: HOME | End: 2022-12-08
Payer: COMMERCIAL

## 2022-12-08 VITALS
HEART RATE: 60 BPM | RESPIRATION RATE: 18 BRPM | DIASTOLIC BLOOD PRESSURE: 74 MMHG | OXYGEN SATURATION: 100 % | TEMPERATURE: 98 F | SYSTOLIC BLOOD PRESSURE: 128 MMHG

## 2022-12-08 DIAGNOSIS — M26.621 ARTHRALGIA OF RIGHT TEMPOROMANDIBULAR JOINT: Primary | ICD-10-CM

## 2022-12-08 PROCEDURE — 99213 OFFICE O/P EST LOW 20 MIN: CPT | Performed by: NURSE PRACTITIONER

## 2022-12-08 RX ORDER — PENICILLIN V POTASSIUM 500 MG/1
500 TABLET ORAL 2 TIMES DAILY
Qty: 14 TABLET | Refills: 0 | Status: SHIPPED | OUTPATIENT
Start: 2022-12-08 | End: 2022-12-15

## 2022-12-08 RX ORDER — NAPROXEN 500 MG/1
500 TABLET ORAL 2 TIMES DAILY PRN
Qty: 20 TABLET | Refills: 0 | Status: SHIPPED | OUTPATIENT
Start: 2022-12-08 | End: 2022-12-18

## 2022-12-08 NOTE — ED INITIAL ASSESSMENT (HPI)
Pt c/o R ear pain and warm sensation to R side of head since yesterday. States pain worse with chewing. No fever. No cough. No runny nose.

## 2022-12-12 RX ORDER — LISINOPRIL 10 MG/1
10 TABLET ORAL DAILY
Qty: 90 TABLET | Refills: 1 | Status: SHIPPED | OUTPATIENT
Start: 2022-12-12

## 2022-12-12 RX ORDER — ROSUVASTATIN CALCIUM 10 MG/1
10 TABLET, COATED ORAL NIGHTLY
Qty: 90 TABLET | Refills: 1 | Status: SHIPPED | OUTPATIENT
Start: 2022-12-12

## 2023-01-12 RX ORDER — ESCITALOPRAM OXALATE 10 MG/1
10 TABLET ORAL DAILY
Qty: 90 TABLET | Refills: 0 | Status: SHIPPED | OUTPATIENT
Start: 2023-01-12

## 2023-01-13 NOTE — TELEPHONE ENCOUNTER
Called the patient and left a voice mail that he needs to schedule a follow-up appointment for the refill request he sent.

## 2023-02-09 ENCOUNTER — NURSE TRIAGE (OUTPATIENT)
Facility: CLINIC | Age: 38
End: 2023-02-09

## 2023-02-09 ENCOUNTER — OFFICE VISIT (OUTPATIENT)
Dept: FAMILY MEDICINE CLINIC | Facility: CLINIC | Age: 38
End: 2023-02-09
Payer: COMMERCIAL

## 2023-02-09 DIAGNOSIS — Z53.8 APPOINTMENT CANCELED BY HOSPITAL: Primary | ICD-10-CM

## 2023-05-18 ENCOUNTER — LAB ENCOUNTER (OUTPATIENT)
Dept: LAB | Age: 38
End: 2023-05-18
Attending: FAMILY MEDICINE
Payer: COMMERCIAL

## 2023-05-18 DIAGNOSIS — Z00.00 ROUTINE GENERAL MEDICAL EXAMINATION AT A HEALTH CARE FACILITY: ICD-10-CM

## 2023-05-18 LAB
ALBUMIN SERPL-MCNC: 4 G/DL (ref 3.4–5)
ALBUMIN/GLOB SERPL: 1.1 {RATIO} (ref 1–2)
ALP LIVER SERPL-CCNC: 64 U/L
ALT SERPL-CCNC: 27 U/L
ANION GAP SERPL CALC-SCNC: 3 MMOL/L (ref 0–18)
AST SERPL-CCNC: 26 U/L (ref 15–37)
BASOPHILS # BLD AUTO: 0.07 X10(3) UL (ref 0–0.2)
BASOPHILS NFR BLD AUTO: 1.1 %
BILIRUB SERPL-MCNC: 0.3 MG/DL (ref 0.1–2)
BUN BLD-MCNC: 18 MG/DL (ref 7–18)
BUN/CREAT SERPL: 15.5 (ref 10–20)
CALCIUM BLD-MCNC: 9.2 MG/DL (ref 8.5–10.1)
CHLORIDE SERPL-SCNC: 109 MMOL/L (ref 98–112)
CHOLEST SERPL-MCNC: 120 MG/DL (ref ?–200)
CO2 SERPL-SCNC: 28 MMOL/L (ref 21–32)
CREAT BLD-MCNC: 1.16 MG/DL
CREAT UR-SCNC: 162 MG/DL
DEPRECATED RDW RBC AUTO: 46.1 FL (ref 35.1–46.3)
EOSINOPHIL # BLD AUTO: 0.6 X10(3) UL (ref 0–0.7)
EOSINOPHIL NFR BLD AUTO: 9.2 %
ERYTHROCYTE [DISTWIDTH] IN BLOOD BY AUTOMATED COUNT: 13.1 % (ref 11–15)
EST. AVERAGE GLUCOSE BLD GHB EST-MCNC: 120 MG/DL (ref 68–126)
FASTING PATIENT LIPID ANSWER: YES
FASTING STATUS PATIENT QL REPORTED: YES
GFR SERPLBLD BASED ON 1.73 SQ M-ARVRAT: 82 ML/MIN/1.73M2 (ref 60–?)
GLOBULIN PLAS-MCNC: 3.6 G/DL (ref 2.8–4.4)
GLUCOSE BLD-MCNC: 94 MG/DL (ref 70–99)
HBA1C MFR BLD: 5.8 % (ref ?–5.7)
HCT VFR BLD AUTO: 42.7 %
HDLC SERPL-MCNC: 43 MG/DL (ref 40–59)
HGB BLD-MCNC: 14.5 G/DL
IMM GRANULOCYTES # BLD AUTO: 0.01 X10(3) UL (ref 0–1)
IMM GRANULOCYTES NFR BLD: 0.2 %
LDLC SERPL CALC-MCNC: 64 MG/DL (ref ?–100)
LYMPHOCYTES # BLD AUTO: 1.24 X10(3) UL (ref 1–4)
LYMPHOCYTES NFR BLD AUTO: 19 %
MCH RBC QN AUTO: 32.3 PG (ref 26–34)
MCHC RBC AUTO-ENTMCNC: 34 G/DL (ref 31–37)
MCV RBC AUTO: 95.1 FL
MICROALBUMIN UR-MCNC: 0.67 MG/DL
MICROALBUMIN/CREAT 24H UR-RTO: 4.1 UG/MG (ref ?–30)
MONOCYTES # BLD AUTO: 0.63 X10(3) UL (ref 0.1–1)
MONOCYTES NFR BLD AUTO: 9.6 %
NEUTROPHILS # BLD AUTO: 3.99 X10 (3) UL (ref 1.5–7.7)
NEUTROPHILS # BLD AUTO: 3.99 X10(3) UL (ref 1.5–7.7)
NEUTROPHILS NFR BLD AUTO: 60.9 %
NONHDLC SERPL-MCNC: 77 MG/DL (ref ?–130)
OSMOLALITY SERPL CALC.SUM OF ELEC: 292 MOSM/KG (ref 275–295)
PLATELET # BLD AUTO: 245 10(3)UL (ref 150–450)
POTASSIUM SERPL-SCNC: 4.4 MMOL/L (ref 3.5–5.1)
PROT SERPL-MCNC: 7.6 G/DL (ref 6.4–8.2)
RBC # BLD AUTO: 4.49 X10(6)UL
SODIUM SERPL-SCNC: 140 MMOL/L (ref 136–145)
TRIGL SERPL-MCNC: 60 MG/DL (ref 30–149)
VLDLC SERPL CALC-MCNC: 9 MG/DL (ref 0–30)
WBC # BLD AUTO: 6.5 X10(3) UL (ref 4–11)

## 2023-05-18 PROCEDURE — 83036 HEMOGLOBIN GLYCOSYLATED A1C: CPT | Performed by: FAMILY MEDICINE

## 2023-05-18 PROCEDURE — 80061 LIPID PANEL: CPT | Performed by: FAMILY MEDICINE

## 2023-05-18 PROCEDURE — 82043 UR ALBUMIN QUANTITATIVE: CPT | Performed by: FAMILY MEDICINE

## 2023-05-18 PROCEDURE — 80053 COMPREHEN METABOLIC PANEL: CPT | Performed by: FAMILY MEDICINE

## 2023-05-18 PROCEDURE — 82570 ASSAY OF URINE CREATININE: CPT | Performed by: FAMILY MEDICINE

## 2023-05-18 PROCEDURE — 85025 COMPLETE CBC W/AUTO DIFF WBC: CPT | Performed by: FAMILY MEDICINE

## 2023-06-29 RX ORDER — LISINOPRIL 10 MG/1
10 TABLET ORAL DAILY
Qty: 90 TABLET | Refills: 3 | Status: SHIPPED | OUTPATIENT
Start: 2023-06-29

## 2023-06-29 RX ORDER — ROSUVASTATIN CALCIUM 10 MG/1
10 TABLET, COATED ORAL NIGHTLY
Qty: 90 TABLET | Refills: 3 | Status: SHIPPED | OUTPATIENT
Start: 2023-06-29

## 2023-09-25 RX ORDER — ESCITALOPRAM OXALATE 20 MG/1
20 TABLET ORAL DAILY
Qty: 90 TABLET | Refills: 0 | Status: SHIPPED | OUTPATIENT
Start: 2023-09-25

## 2023-09-25 NOTE — TELEPHONE ENCOUNTER
Protocol failed for appointment only  90 day refill given on 09/25/23, appointment needed for further refills.     Requested Prescriptions   Pending Prescriptions Disp Refills    ESCITALOPRAM 20 MG Oral Tab [Pharmacy Med Name: ESCITALOPRAM 20 MG TABLET] 30 tablet 0     Sig: TAKE ONE TABLET BY MOUTH DAILY       Psychiatric Non-Scheduled (Anti-Anxiety) Failed - 9/25/2023  5:05 AM        Failed - In person appointment or virtual visit in the past 6 mos or appointment in next 3 mos     Recent Outpatient Visits              6 months ago [address] [place], [place] [address], [address] [place] [address]    7 months ago Appointment canceled by hospitals    Trang Segal MD    Office Visit    1 year ago [facility], [address] [place] [address]    1 year ago Encounter for routine adult health examination without abnormal findings    [address] [place] , [place]    Office Visit    2 years ago Acute non-recurrent maxillary sinusitis    [facility], [facility]., Jeffrey Thakur NP    Office Visit                            Recent Outpatient Visits              6 months ago [address] [place] [address] [facility], [address] [place] [address]    7 months ago Appointment canceled by hospitals    Trang Segal MD    Office Visit    1 year ago [facility], [address] [place] [address], [address] [place] [address]    1 year ago Encounter for routine adult health examination without abnormal findings    [address] [place], [place]    Office Visit    2 years ago Acute non-recurrent maxillary sinusitis    [facility], [address] [place] [address] [address], Corinne Height, NP    Office Visit

## 2023-12-28 ENCOUNTER — TELEPHONE (OUTPATIENT)
Facility: CLINIC | Age: 38
End: 2023-12-28

## 2023-12-28 RX ORDER — ESCITALOPRAM OXALATE 20 MG/1
20 TABLET ORAL DAILY
Qty: 90 TABLET | Refills: 0 | Status: SHIPPED | OUTPATIENT
Start: 2023-12-28

## 2023-12-28 NOTE — TELEPHONE ENCOUNTER
Please call patient to make an appointment, 90 day refill sent, [employer] message sent.   Thank you

## 2023-12-28 NOTE — TELEPHONE ENCOUNTER
STEFFENI: PSR don't work with Dr Thien Shabazz, please re-route message to the proper pool, thank you.

## 2023-12-28 NOTE — TELEPHONE ENCOUNTER
Please review; protocol failed/ No protocol     90 day refill given on 12/28/23, appointment needed for further refills.     Requested Prescriptions   Pending Prescriptions Disp Refills    ESCITALOPRAM 20 MG Oral Tab [Pharmacy Med Name: ESCITALOPRAM 20 MG TABLET] 30 tablet 0     Sig: TAKE 1 TABLET BY MOUTH DAILY       Psychiatric Non-Scheduled (Anti-Anxiety) Failed - 12/28/2023  5:05 AM        Failed - In person appointment or virtual visit in the past 6 mos or appointment in next 3 mos     Recent Outpatient Visits              9 months ago [address] [place] [address], [address] [place] [address]    10 months ago Appointment canceled by [facility]    Peter Gardner MD    Office Visit    1 year ago [address] [place] [address], [address] [place] [address], [address] [place] [address]    1 year ago Encounter for routine adult health examination without abnormal findings    [address], [address]    Office Visit    2 years ago Acute non-recurrent maxillary sinusitis    [facility], [facility] Anna Ashraf, [place]    Office Visit                           Recent Outpatient Visits              9 months ago [address] [place] [address], [facility], [address] [place] [address]    10 months ago Appointment canceled by [facility]    Peter Gardner MD    Office Visit    1 year ago [address] [place] [address], [address] [place] [address], [address] [place] [address]    1 year ago Encounter for routine adult health examination without abnormal findings    [address], [address], [address] [place] [address]    Office Visit    2 years ago Acute non-recurrent maxillary sinusitis    [facility], [facility], [place] [address], [place], Sharron Mcardle, NP    Office Visit

## 2024-04-04 RX ORDER — ESCITALOPRAM OXALATE 20 MG/1
20 TABLET ORAL DAILY
Qty: 90 TABLET | Refills: 0 | Status: SHIPPED | OUTPATIENT
Start: 2024-04-04

## 2024-04-04 NOTE — TELEPHONE ENCOUNTER
Please review. Protocol failed / No protocol.    Requested Prescriptions   Pending Prescriptions Disp Refills    ESCITALOPRAM 20 MG Oral Tab [Pharmacy Med Name: ESCITALOPRAM 20 MG TABLET] 30 tablet 0     Sig: TAKE 1 TABLET BY MOUTH DAILY       Psychiatric Non-Scheduled (Anti-Anxiety) Failed - 4/2/2024  5:06 AM        Failed - Depression Screening completed within the past 12 months        Passed - In person appointment or virtual visit in the past 6 mos or appointment in next 3 mos     Recent Outpatient Visits              1 year ago Anxiety    [facility] Aleta Chau DO    Office Visit    [facility]    [facility] Visit    1 year ago Anxiety    [facility] Aleta Chau DO    Telemedicine    2 years ago Encounter for routine adult health examination without abnormal findings    [facility] Aleta Chau DO    Office Visit    2 years ago Acute non-recurrent maxillary sinusitis    [facility], [facility], [address] Gold Montoya NP    Office Visit          Future Appointments         Provider Department Appt Notes    In 4 days Aleta Chau DO [facility] Physical and blood work                    Recent Outpatient Visits              1 year ago Anxiety    [facility] Aleta Chau DO    Office Visit    1 year [facility]    Office Visit    1 year ago Anxiety    [facility] Aleta Chau DO    Telemedicine    2 years ago Encounter for routine adult health examination without abnormal findings    [facility],  [facility] Aleta Chau DO    Office Visit    2 years ago Acute non-recurrent maxillary sinusitis    [facility], [facility], [facility], [place] Montoya, L[place]    Office Visit            Future Appointments         Provider Department Appt Notes    In 4 days Aleta Chau DO [facility], [facility] Physical and blood work

## 2024-04-08 ENCOUNTER — OFFICE VISIT (OUTPATIENT)
Facility: CLINIC | Age: 39
End: 2024-04-08
Payer: COMMERCIAL

## 2024-04-08 VITALS
SYSTOLIC BLOOD PRESSURE: 126 MMHG | HEIGHT: 72 IN | OXYGEN SATURATION: 97 % | HEART RATE: 72 BPM | BODY MASS INDEX: 27.09 KG/M2 | WEIGHT: 200 LBS | DIASTOLIC BLOOD PRESSURE: 82 MMHG

## 2024-04-08 DIAGNOSIS — I10 ESSENTIAL HYPERTENSION: ICD-10-CM

## 2024-04-08 DIAGNOSIS — F41.9 ANXIETY: ICD-10-CM

## 2024-04-08 DIAGNOSIS — E78.2 MIXED HYPERLIPIDEMIA: ICD-10-CM

## 2024-04-08 DIAGNOSIS — R41.840 INATTENTION: ICD-10-CM

## 2024-04-08 DIAGNOSIS — Z00.00 ENCOUNTER FOR ROUTINE ADULT HEALTH EXAMINATION WITHOUT ABNORMAL FINDINGS: Primary | ICD-10-CM

## 2024-04-08 RX ORDER — LISINOPRIL 5 MG/1
5 TABLET ORAL DAILY
Qty: 90 TABLET | Refills: 1 | Status: SHIPPED | OUTPATIENT
Start: 2024-04-08

## 2024-04-08 NOTE — PROGRESS NOTES
Quintin Zayas is a 38 year old male who presents for a complete physical exam.   HPI:     Concerns: Feels like the Lexapro has really helped his anxiety, and has been taking 20 mg daily. He does lose focus at work at times, and this has not improved. Does feel his depression has been stable.  Has noticed when he stands up too quickly at times he will fill dizzy or lightheaded.    Last colonoscopy:  Never had one before   Diet/exercise: Started hiking and walking regularly. Also rides his bike during the warmer months. Eats one meal a day around 4:30-5, and usually has a quick salad or vegetable tacos or burritos. Drinks a lot of water. Has a rare soda, but not regularly.    Hx of STI screenin and negative  History of intimate partner violence: None  Substance abuse: None  Vaccines- Tdap: 2018, Pneumovax: ,  Covid: Completed    REVIEW OF SYSTEMS:   GENERAL: feels well otherwise   SKIN: denies any unusual skin lesions  EYES:denies vision change  HEENT: no hearing changes  LUNGS: denies shortness of breath with exertion  CARDIOVASCULAR: denies chest pain on exertion  GI: denies abdominal pain, constipation or diarrhea, no hematochezia   : denies nocturia or changes in stream  NEURO: denies headaches  PSYCHE: stable anxiety and depression     Current Outpatient Medications   Medication Sig Dispense Refill    lisinopril 5 MG Oral Tab Take 1 tablet (5 mg total) by mouth daily. 90 tablet 1    escitalopram 20 MG Oral Tab Take 1 tablet (20 mg total) by mouth daily. 90 tablet 0    rosuvastatin 10 MG Oral Tab Take 1 tablet (10 mg total) by mouth nightly. 90 tablet 3      Past Medical History:   Diagnosis Date    Anxiety     Ongoing after stopping Venlafaxine    COVID-19 2021    Depression     Lately just feeling sad and burned out a lot    Essential hypertension     History of high cholesterol     Hyperlipidemia     Metabolic disorder     Prediabetes       History reviewed. No pertinent surgical history.    Family History   Problem Relation Age of Onset    Heart Attack Mother     Hypertension Father     High Cholesterol Father     Cancer Father         Lung      Social History:  Social History     Socioeconomic History    Marital status:    Tobacco Use    Smoking status: Former     Packs/day: 0     Types: Cigarettes     Quit date: 2022     Years since quittin.8    Smokeless tobacco: Never   Vaping Use    Vaping Use: Some days    Substances: Nicotine, THC   Substance and Sexual Activity    Alcohol use: Not Currently     Comment: ocassionally     Drug use: Yes     Types: Cannabis     Comment: occ    Sexual activity: Yes     Partners: Female   Other Topics Concern    Caffeine Concern No    Exercise Yes    Weight Concern Yes      Occ: Works in property management   :  Children: None        EXAM:     Wt Readings from Last 6 Encounters:   24 200 lb (90.7 kg)   23 233 lb (105.7 kg)   02/10/22 259 lb (117.5 kg)   21 269 lb (122 kg)   21 270 lb (122.5 kg)   21 260 lb (117.9 kg)     Body mass index is 27.12 kg/m².    /82   Pulse 72   Ht 6' (1.829 m)   Wt 200 lb (90.7 kg)   SpO2 97%   BMI 27.12 kg/m²      GENERAL: well developed, well nourished,in no apparent distress  SKIN: no rashes,no suspicious lesions  HEENT: atraumatic, normocephalic  EYES: PERRLA, EOMI  NECK: supple,no adenopathy   LUNGS: clear to auscultation  CARDIO: RRR without murmur  GI: good bowel sounds,no masses, HSM or tenderness  EXTREMITIES: no cyanosis, clubbing or edema  NEURO: Oriented times three, cranial nerves are intact, motor and sensory are grossly intact    Cholesterol, Total (mg/dL)   Date Value   2023 120   2022 159   2020 236 (H)   2008 227 (H)     HDL Cholesterol (mg/dL)   Date Value   2023 43   2022 40   2020 41   2008 39 (L)     LDL Cholesterol Calc (mg/dL)   Date Value   2008 149 (H)     LDL Cholesterol (mg/dL)   Date  Value   05/18/2023 64   02/12/2022 99   11/14/2020 174 (H)     Triglycerides (mg/dL)   Date Value   08/26/2008 195 (H)     AST (SGOT) (IU/L)   Date Value   08/26/2008 39     AST (U/L)   Date Value   05/18/2023 26   02/12/2022 18   01/07/2020 27   06/17/2016 23     ALT (SGPT) (IU/L)   Date Value   08/26/2008 71 (H)     ALT (U/L)   Date Value   05/18/2023 27   02/12/2022 29   01/07/2020 34   06/17/2016 43      ASSESSMENT AND PLAN:   Quintin Zayas is a 38 year old male who presents for a complete physical exam.    Encounter Diagnoses   Name Primary?    Encounter for routine adult health examination without abnormal findings Yes    Essential hypertension     Mixed hyperlipidemia     Anxiety     Inattention      Orders Placed This Encounter   Procedures    CBC With Differential With Platelet    Comp Metabolic Panel (14)    Hemoglobin A1C    Lipid Panel    Microalb/Creat Ratio, Random Urine [E]     Will lower dose of Lisinopril to 5 mg daily given significant weight loss through regular exercise and healthier diet and noted dizziness with standing at times. Monitor BP at home and notify me if too high or too low, but otherwise follow-up in 3 months.  Continue Rosuvastatin 10 mg nightly, and will check baseline labs and microalbumin screening today.  Anxiety and depression very well controlled on Lexapro 20 mg daily, and referral to [facility] given for neuropsychiatric testing due to possible ADHD.     Discussed with patient the following:  -Risks and benefits of screening for prostate cancer  -Colon cancer screening  -Prevention of skin cancer  -Healthy diet including adequate intake of vegetables and fruits, appropriate portion sizes, minimizing highly concentrated carbohydrate foods  -Exercising 30 minutes a day most days of the week   -Importance of regular exercise and weight maintenance  -Smoking cessation   -Diabetes screening which he desires  -Recommendation for yearly influenza vaccine  -Need for  Tdap once as an adult and Td booster every 10 years  -Contraception: partner methods, condoms, vasectomy  -STI screening (GC/Chlamydia/HIV) which he declines  -Hepatitis C screening for all adults between the ages of 18 and 79: Checked and negative       Meds & Refills for this Visit:  Requested Prescriptions     Signed Prescriptions Disp Refills    lisinopril 5 MG Oral Tab 90 tablet 1     Sig: Take 1 tablet (5 mg total) by mouth daily.       Imaging & Consults:   NAVIGATOR    Return in 6 months for follow-up, 1 year for annual physical or sooner as needed.     Aleta Chau DO  04/08/24   5:04 PM

## 2024-04-15 ENCOUNTER — TELEPHONE (OUTPATIENT)
Age: 39
End: 2024-04-15

## 2024-04-15 NOTE — TELEPHONE ENCOUNTER
Nichole Díaz, Psychologist  [address]  [facility] [address]  Phone: [phone]    Patrick Alvarado, Psychologist  [address]  [address]  [facility] [address]  Phone: [phone]    Damari Armendariz, Psychologist  [address]  [address]  [facility] [address]  Phone: [phone]    Lala Lopez, Psychologist  [address] [facility] [address]  [facility] [address]  Phone: [phone]

## 2024-04-29 ENCOUNTER — LAB ENCOUNTER (OUTPATIENT)
Dept: LAB | Facility: HOSPITAL | Age: 39
End: 2024-04-29
Attending: FAMILY MEDICINE
Payer: COMMERCIAL

## 2024-04-29 DIAGNOSIS — I10 ESSENTIAL HYPERTENSION: ICD-10-CM

## 2024-04-29 DIAGNOSIS — D64.9 ANEMIA, UNSPECIFIED TYPE: ICD-10-CM

## 2024-04-29 DIAGNOSIS — D64.9 ANEMIA, UNSPECIFIED TYPE: Primary | ICD-10-CM

## 2024-04-29 DIAGNOSIS — Z00.00 ENCOUNTER FOR ROUTINE ADULT HEALTH EXAMINATION WITHOUT ABNORMAL FINDINGS: ICD-10-CM

## 2024-04-29 LAB
ALBUMIN SERPL-MCNC: 4.6 G/DL (ref 3.2–4.8)
ALBUMIN/GLOB SERPL: 1.8 {RATIO} (ref 1–2)
ALP LIVER SERPL-CCNC: 55 U/L
ALT SERPL-CCNC: 15 U/L
ANION GAP SERPL CALC-SCNC: 4 MMOL/L (ref 0–18)
AST SERPL-CCNC: 24 U/L (ref ?–34)
BASOPHILS # BLD AUTO: 0.06 X10(3) UL (ref 0–0.2)
BASOPHILS NFR BLD AUTO: 1 %
BILIRUB SERPL-MCNC: 0.6 MG/DL (ref 0.3–1.2)
BUN BLD-MCNC: 19 MG/DL (ref 9–23)
BUN/CREAT SERPL: 16.5 (ref 10–20)
CALCIUM BLD-MCNC: 9.5 MG/DL (ref 8.7–10.4)
CHLORIDE SERPL-SCNC: 109 MMOL/L (ref 98–112)
CHOLEST SERPL-MCNC: 109 MG/DL (ref ?–200)
CO2 SERPL-SCNC: 27 MMOL/L (ref 21–32)
CREAT BLD-MCNC: 1.15 MG/DL
CREAT UR-SCNC: 72.3 MG/DL
DEPRECATED HBV CORE AB SER IA-ACNC: 49.6 NG/ML
DEPRECATED RDW RBC AUTO: 46.5 FL (ref 35.1–46.3)
EGFRCR SERPLBLD CKD-EPI 2021: 83 ML/MIN/1.73M2 (ref 60–?)
EOSINOPHIL # BLD AUTO: 0.26 X10(3) UL (ref 0–0.7)
EOSINOPHIL NFR BLD AUTO: 4.1 %
ERYTHROCYTE [DISTWIDTH] IN BLOOD BY AUTOMATED COUNT: 13.3 % (ref 11–15)
EST. AVERAGE GLUCOSE BLD GHB EST-MCNC: 111 MG/DL (ref 68–126)
FASTING PATIENT LIPID ANSWER: YES
FASTING STATUS PATIENT QL REPORTED: YES
GLOBULIN PLAS-MCNC: 2.6 G/DL (ref 2.8–4.4)
GLUCOSE BLD-MCNC: 84 MG/DL (ref 70–99)
HBA1C MFR BLD: 5.5 % (ref ?–5.7)
HCT VFR BLD AUTO: 35.9 %
HDLC SERPL-MCNC: 45 MG/DL (ref 40–59)
HGB BLD-MCNC: 12.3 G/DL
IMM GRANULOCYTES # BLD AUTO: 0.01 X10(3) UL (ref 0–1)
IMM GRANULOCYTES NFR BLD: 0.2 %
IRON SATN MFR SERPL: 23 %
IRON SERPL-MCNC: 86 UG/DL
LDLC SERPL CALC-MCNC: 51 MG/DL (ref ?–100)
LYMPHOCYTES # BLD AUTO: 1.65 X10(3) UL (ref 1–4)
LYMPHOCYTES NFR BLD AUTO: 26.1 %
MCH RBC QN AUTO: 32.5 PG (ref 26–34)
MCHC RBC AUTO-ENTMCNC: 34.3 G/DL (ref 31–37)
MCV RBC AUTO: 94.7 FL
MICROALBUMIN UR-MCNC: <0.3 MG/DL
MONOCYTES # BLD AUTO: 0.36 X10(3) UL (ref 0.1–1)
MONOCYTES NFR BLD AUTO: 5.7 %
NEUTROPHILS # BLD AUTO: 3.97 X10 (3) UL (ref 1.5–7.7)
NEUTROPHILS # BLD AUTO: 3.97 X10(3) UL (ref 1.5–7.7)
NEUTROPHILS NFR BLD AUTO: 62.9 %
NONHDLC SERPL-MCNC: 64 MG/DL (ref ?–130)
OSMOLALITY SERPL CALC.SUM OF ELEC: 291 MOSM/KG (ref 275–295)
PLATELET # BLD AUTO: 242 10(3)UL (ref 150–450)
POTASSIUM SERPL-SCNC: 4.2 MMOL/L (ref 3.5–5.1)
PROT SERPL-MCNC: 7.2 G/DL (ref 5.7–8.2)
RBC # BLD AUTO: 3.79 X10(6)UL
SODIUM SERPL-SCNC: 140 MMOL/L (ref 136–145)
TIBC SERPL-MCNC: 367 UG/DL (ref 250–425)
TRANSFERRIN SERPL-MCNC: 246 MG/DL (ref 215–365)
TRIGL SERPL-MCNC: 54 MG/DL (ref 30–149)
VLDLC SERPL CALC-MCNC: 8 MG/DL (ref 0–30)
WBC # BLD AUTO: 6.3 X10(3) UL (ref 4–11)

## 2024-04-29 PROCEDURE — 85025 COMPLETE CBC W/AUTO DIFF WBC: CPT | Performed by: FAMILY MEDICINE

## 2024-04-29 PROCEDURE — 80061 LIPID PANEL: CPT | Performed by: FAMILY MEDICINE

## 2024-04-29 PROCEDURE — 80053 COMPREHEN METABOLIC PANEL: CPT | Performed by: FAMILY MEDICINE

## 2024-04-29 PROCEDURE — 82570 ASSAY OF URINE CREATININE: CPT | Performed by: FAMILY MEDICINE

## 2024-04-29 PROCEDURE — 82043 UR ALBUMIN QUANTITATIVE: CPT | Performed by: FAMILY MEDICINE

## 2024-04-29 PROCEDURE — 83540 ASSAY OF IRON: CPT | Performed by: FAMILY MEDICINE

## 2024-04-29 PROCEDURE — 83036 HEMOGLOBIN GLYCOSYLATED A1C: CPT | Performed by: FAMILY MEDICINE

## 2024-04-29 PROCEDURE — 82728 ASSAY OF FERRITIN: CPT | Performed by: FAMILY MEDICINE

## 2024-04-29 PROCEDURE — 84466 ASSAY OF TRANSFERRIN: CPT | Performed by: FAMILY MEDICINE

## 2024-07-17 RX ORDER — ROSUVASTATIN CALCIUM 10 MG/1
10 TABLET, COATED ORAL NIGHTLY
Qty: 90 TABLET | Refills: 3 | Status: SHIPPED | OUTPATIENT
Start: 2024-07-17

## 2024-07-17 RX ORDER — ESCITALOPRAM OXALATE 20 MG/1
20 TABLET ORAL DAILY
Qty: 90 TABLET | Refills: 3 | Status: SHIPPED | OUTPATIENT
Start: 2024-07-17

## 2024-07-18 NOTE — TELEPHONE ENCOUNTER
Refill passed per [facility] protocol.    Requested Prescriptions   Pending Prescriptions Disp Refills    ROSUVASTATIN 10 MG Oral Tab [Pharmacy Med Name: ROSUVASTATIN CALCIUM 10 MG TAB] 30 tablet 0     Sig: TAKE ONE TABLET BY MOUTH ONCE NIGHTLY       Cholesterol Medication Protocol Passed - 7/15/2024  5:05 AM        Passed - ALT < 80     Lab Results   Component Value Date    ALT 15 04/29/2024             Passed - ALT resulted within past year        Passed - Lipid panel within past 12 months     Lab Results   Component Value Date    CHOLEST 109 04/29/2024    TRIG 54 04/29/2024    HDL 45 04/29/2024    LDL 51 04/29/2024    VLDL 8 04/29/2024    NONHDLC 64 04/29/2024             Passed - In person appointment or virtual visit in the past 12 mos or appointment in next 3 mos     Recent Outpatient Visits              3 months ago Encounter for routine adult health examination without abnormal findings    [facility], [facility] [place]    Office Visit    1 year ago Anxiety    [facility] Aleta Dunne DO    Office Visit    1 year ago [facility]    2 years ago Anxiety    [facility] Aleta Chau DO    Telemedicine    2 years ago Encounter for routine adult health examination without abnormal findings    [facility] [place]    Office Visit                        ESCITALOPRAM 20 MG Oral Tab [Pharmacy Med Name: ESCITALOPRAM 20 MG TABLET] 30 tablet 0     Sig: TAKE 1 TABLET BY MOUTH DAILY       Psychiatric Non-Scheduled (Anti-Anxiety) Passed - 7/15/2024  5:05 AM        Passed - In person appointment or virtual visit in the past 6 mos or appointment in next 3 mos     Recent Outpatient Visits              3 months ago Encounter  for routine adult health examination without abnormal findings    [facility] [place]     Office Visit    1 year ago Anxiety    [facility] [place]     Office Visit    1 year [facility]    Office Visit    2 years ago Anxiety    [facility] [place]     Telemedicine    2 years ago Encounter for routine adult health examination without abnormal findings    [facility] [place]     Office Visit                      Passed - Depression Screening completed within the past 12 months             Recent Outpatient Visits              3 months ago Encounter for routine adult health examination without abnormal findings    [facility] [place],     Office Visit    1 year ago Anxiety    [facility]     [facility] Visit    1 year ago Appointment canceled by [facility], [place] Jesus, Maxine Garsia MD    Office Visit    2 years ago Anxiety    [facility] [place] [place]     Telemedicine    2 years ago Encounter for routine adult health examination without abnormal findings    [facility]     [facility] Visit

## 2024-07-18 NOTE — TELEPHONE ENCOUNTER
Refill passed per [facility] protocol.    Requested Prescriptions   Pending Prescriptions Disp Refills    ROSUVASTATIN 10 MG Oral Tab [Pharmacy Med Name: ROSUVASTATIN CALCIUM 10 MG TAB] 30 tablet 0     Sig: TAKE ONE TABLET BY MOUTH ONCE NIGHTLY       Cholesterol Medication Protocol Passed - 7/15/2024  5:05 AM        Passed - ALT < 80     Lab Results   Component Value Date    ALT 15 04/29/2024             Passed - ALT resulted within past year        Passed - Lipid panel within past 12 months     Lab Results   Component Value Date    CHOLEST 109 04/29/2024    TRIG 54 04/29/2024    HDL 45 04/29/2024    LDL 51 04/29/2024    VLDL 8 04/29/2024    NONHDLC 64 04/29/2024             Passed - In person appointment or virtual visit in the past 12 mos or appointment in next 3 mos     Recent Outpatient Visits              3 months ago Encounter for routine adult health examination without abnormal findings    [facility], [facility] [place]    Office Visit    1 year ago Anxiety    [facility] Ale[facility]ne DO    Office Visit    1 year ago [facility]    [facility]    2 years ago Anxiety    [facility] Aleta Chau DO    Telemedicine    2 years ago Encounter for routine adult health examination without abnormal findings    [facility] [place] [facility]    Office Visit                        ESCITALOPRAM 20 MG Oral Tab [Pharmacy Med Name: ESCITALOPRAM 20 MG TABLET] 30 tablet 0     Sig: TAKE 1 TABLET BY MOUTH DAILY       Psychiatric Non-Scheduled (Anti-Anxiety) Passed - 7/15/2024  5:05 AM        Passed - In person appointment or virtual visit in the past 6 mos or appointment in next 3 mos     Recent Outpatient Visits              3 months ago Encounter  for routine adult health examination without abnormal findings    [facility] [place]     Office Visit    1 year ago Anxiety    [facility] [place] [place]     Office Visit    [facility]    Office Visit    2 years ago Anxiety    [facility] [place] [place]     Telemedicine    2 years ago Encounter for routine adult health examination without abnormal findings    [facility] [place] [place]     Office Visit                      Passed - Depression Screening completed within the past 12 months             Recent Outpatient Visits              3 months ago Encounter for routine adult health examination without abnormal findings    [facility] [place],     Office Visit    1 year ago Anxiety    [facility]     Office Visit    1 year ago Appointment canceled by [facility], [place] [facility], Maxine Garsia MD    Office Visit    2 years ago Anxiety    [facility] [place] [place]     Telemedicine    2 years ago Encounter for routine adult health examination without abnormal findings    [facility]     [facility] Visit

## 2024-07-26 RX ORDER — ESCITALOPRAM OXALATE 20 MG/1
20 TABLET ORAL DAILY
Qty: 90 TABLET | Refills: 3 | OUTPATIENT
Start: 2024-07-26

## 2024-07-26 RX ORDER — ROSUVASTATIN CALCIUM 10 MG/1
10 TABLET, COATED ORAL NIGHTLY
Qty: 90 TABLET | Refills: 3 | OUTPATIENT
Start: 2024-07-26

## 2024-07-26 RX ORDER — LISINOPRIL 5 MG/1
5 TABLET ORAL DAILY
Qty: 90 TABLET | Refills: 3 | Status: SHIPPED | OUTPATIENT
Start: 2024-07-26

## 2024-08-26 ENCOUNTER — LAB ENCOUNTER (OUTPATIENT)
Dept: LAB | Age: 39
End: 2024-08-26
Attending: FAMILY MEDICINE
Payer: COMMERCIAL

## 2024-08-26 DIAGNOSIS — D64.9 ANEMIA, UNSPECIFIED TYPE: ICD-10-CM

## 2024-08-26 LAB
BASOPHILS # BLD AUTO: 0.06 X10(3) UL (ref 0–0.2)
BASOPHILS NFR BLD AUTO: 0.9 %
DEPRECATED RDW RBC AUTO: 48.6 FL (ref 35.1–46.3)
EOSINOPHIL # BLD AUTO: 0.72 X10(3) UL (ref 0–0.7)
EOSINOPHIL NFR BLD AUTO: 11.2 %
ERYTHROCYTE [DISTWIDTH] IN BLOOD BY AUTOMATED COUNT: 13.6 % (ref 11–15)
HCT VFR BLD AUTO: 36.7 %
HGB BLD-MCNC: 12.4 G/DL
IMM GRANULOCYTES # BLD AUTO: 0.01 X10(3) UL (ref 0–1)
IMM GRANULOCYTES NFR BLD: 0.2 %
LYMPHOCYTES # BLD AUTO: 1.84 X10(3) UL (ref 1–4)
LYMPHOCYTES NFR BLD AUTO: 28.6 %
MCH RBC QN AUTO: 32.9 PG (ref 26–34)
MCHC RBC AUTO-ENTMCNC: 33.8 G/DL (ref 31–37)
MCV RBC AUTO: 97.3 FL
MONOCYTES # BLD AUTO: 0.42 X10(3) UL (ref 0.1–1)
MONOCYTES NFR BLD AUTO: 6.5 %
NEUTROPHILS # BLD AUTO: 3.38 X10 (3) UL (ref 1.5–7.7)
NEUTROPHILS # BLD AUTO: 3.38 X10(3) UL (ref 1.5–7.7)
NEUTROPHILS NFR BLD AUTO: 52.6 %
PLATELET # BLD AUTO: 270 10(3)UL (ref 150–450)
RBC # BLD AUTO: 3.77 X10(6)UL
WBC # BLD AUTO: 6.4 X10(3) UL (ref 4–11)

## 2024-08-26 PROCEDURE — 85025 COMPLETE CBC W/AUTO DIFF WBC: CPT | Performed by: FAMILY MEDICINE

## 2024-08-27 ENCOUNTER — PATIENT MESSAGE (OUTPATIENT)
Facility: CLINIC | Age: 39
End: 2024-08-27

## 2024-08-27 DIAGNOSIS — D64.9 NORMOCYTIC ANEMIA: Primary | ICD-10-CM

## 2024-08-28 NOTE — TELEPHONE ENCOUNTER
Fabiano Orozco,     Your hemoglobin is slightly better, but still low. Since you are not iron deficient based on the last check I still do not think a vitamin would be recommended, but we could consider having you see a hematologist for further evaluation since it has remained low. It is still only borderline low, but given it has been normal up until now I think it would be a reasonable next step. Let me know if you are interested in this so I can place a referral.     Brian,     Dr. Chau   Written by Aleta Chau DO on 8/26/2024  5:48 PM CDT  Seen by patient Ernesto Zayas on 8/27/2024  7:39 PM

## 2024-08-29 ENCOUNTER — TELEPHONE (OUTPATIENT)
Dept: HEMATOLOGY/ONCOLOGY | Facility: HOSPITAL | Age: 39
End: 2024-08-29

## 2024-08-29 NOTE — TELEPHONE ENCOUNTER
Patient is a new consult referred by Dr. Aleta Chau for Dr. Berman. Normocytic anemia [D64.9]   Called 8/29/24. MP

## 2024-08-29 NOTE — TELEPHONE ENCOUNTER
New consult for Normocytic anemia. Patient is being referred by Dr. Randi Chau to see Dr. Berman. Patient is calling to schedule new consult appointment. Please call patient is schedule at [phone]. Called 8/29/24 GA

## 2024-09-03 ENCOUNTER — OFFICE VISIT (OUTPATIENT)
Dept: HEMATOLOGY/ONCOLOGY | Facility: HOSPITAL | Age: 39
End: 2024-09-03
Attending: INTERNAL MEDICINE
Payer: COMMERCIAL

## 2024-09-03 VITALS
TEMPERATURE: 99 F | HEART RATE: 65 BPM | HEIGHT: 72 IN | DIASTOLIC BLOOD PRESSURE: 53 MMHG | BODY MASS INDEX: 24.3 KG/M2 | SYSTOLIC BLOOD PRESSURE: 105 MMHG | OXYGEN SATURATION: 98 % | RESPIRATION RATE: 16 BRPM | WEIGHT: 179.38 LBS

## 2024-09-03 DIAGNOSIS — D50.9 IRON DEFICIENCY ANEMIA, UNSPECIFIED IRON DEFICIENCY ANEMIA TYPE: ICD-10-CM

## 2024-09-03 DIAGNOSIS — D64.9 ANEMIA, UNSPECIFIED TYPE: Primary | ICD-10-CM

## 2024-09-03 PROCEDURE — 99244 OFF/OP CNSLTJ NEW/EST MOD 40: CPT | Performed by: INTERNAL MEDICINE

## 2024-09-03 NOTE — CONSULTS
Hematology Oncology Consultation Note    Patient Name: Quintin Zayas   YOB: 1985   Medical Record Number: [id number]   CSN: [id number]   Consulting Physician: Lorri Berman MD  Referring Physician(s): Aleta Chau DO   Date of Consultation: 9/3/2024     Reason for Consultation:    Encounter Diagnoses   Name Primary?    Iron deficiency anemia, unspecified iron deficiency anemia type Yes       History of Present Illness:   Quintin Zayasis a 38 year old White male with history of HTN, HLD, anxiety referred for evaluation of anemia.   His labs show mild normocytic anemia first noted in 4/2024 with Hgb 12.3, down from baseline 14-15. Repeat labs now show stable anemia Hgb 12.4 with borderline low ferritin. He has been very tired and lightheaded lately. Denies chest pain, palpitations or dyspnea. Denies melena, hematochezia or changes in bowel movements. He has been going through a lot of stress and anxiety since his father in [place] was diagnosed with lung cancer. He has been losing a lot of weight over the past year, some of it is intentional. He eats small meals 1-2 times a day.  Not vegetarian. Drinks alcohol socially, smokes marijuana daily.    No history of GI problems or autoimmune disease. No history of abdominal surgeries. No NSAID or ASA use.       Past Medical History:  Past Medical History:    Anxiety    Ongoing after stopping Venlafaxine    COVID-19    Depression    Lately just feeling sad and burned out a lot    Essential hypertension    History of high cholesterol    Hyperlipidemia    Metabolic disorder    Prediabetes       Past Surgical History:  History reviewed. No pertinent surgical history.    Family Medical History:  Family History   Problem Relation Age of Onset    Heart Attack Mother     Hypertension Father     High Cholesterol Father     Cancer Father         Lung     Psychosocial History:  Social History     Socioeconomic History    Marital status:      Spouse name:  Not on file    Number of children: Not on file    Years of education: Not on file    Highest education level: Not on file   Occupational History    Not on file   Tobacco Use    Smoking status: Former     Current packs/day: 0.00     Types: Cigarettes     Quit date: 2022     Years since quittin.2    Smokeless tobacco: Never   Vaping Use    Vaping status: Some Days    Substances: Nicotine, THC   Substance and Sexual Activity    Alcohol use: Not Currently     Comment: ocassionally     Drug use: Yes     Types: Cannabis     Comment: occ    Sexual activity: Yes     Partners: Female   Other Topics Concern    Caffeine Concern No    Exercise Yes    Seat Belt Not Asked    Special Diet Not Asked    Stress Concern Not Asked    Weight Concern Yes   Social History Narrative    Not on file     Social Determinants of Health     Financial Resource Strain: Not on file   Food Insecurity: Not on file   Transportation Needs: Not on file   Physical Activity: Not on file   Stress: Not on file   Social Connections: Not on file   Housing Stability: Not on file       Allergies:   No Known Allergies    Current Medications:    Current Outpatient Medications:     lisinopril 5 MG Oral Tab, Take 1 tablet (5 mg total) by mouth daily., Disp: 90 tablet, Rfl: 3    rosuvastatin 10 MG Oral Tab, Take 1 tablet (10 mg total) by mouth nightly., Disp: 90 tablet, Rfl: 3    escitalopram 20 MG Oral Tab, Take 1 tablet (20 mg total) by mouth daily., Disp: 90 tablet, Rfl: 3    Review of Systems:  A comprehensive 10-point ROS completed all negative unless otherwise documented in HPI.     Vital Signs:  /53 (BP Location: Left arm, Patient Position: Sitting, Cuff Size: adult)   Pulse 65   Temp 98.7 °F (37.1 °C) (Oral)   Resp 16   Ht 1.829 m (6')   Wt 81.4 kg (179 lb 6.4 oz)   SpO2 98%   BMI 24.33 kg/m²     Wt Readings from Last 6 Encounters:   24 81.4 kg (179 lb 6.4 oz)   24 90.7 kg (200 lb)   23 105.7 kg (233 lb)   02/10/22  117.5 kg (259 lb)   08/07/21 122 kg (269 lb)   07/24/21 122.5 kg (270 lb)      Physical Examination:  General: Alert and oriented x 3, not in acute distress.  HEENT: Non-icteric sclera. Oropharynx is clear.   Chest: Non labored breathing.   Heart: Regular pulse.   Abdomen: Non distended.   Extremities: No edema.   Neurological: Grossly intact.   Psych/Depression: Appropriate mood and affect.     Laboratory:    Lab Results   Component Value Date    WBC 6.4 08/26/2024    RBC 3.77 (L) 08/26/2024    HGB 12.4 (L) 08/26/2024    HCT 36.7 (L) 08/26/2024    MCV 97.3 08/26/2024    MCH 32.9 08/26/2024    MCHC 33.8 08/26/2024    RDW 13.6 08/26/2024    .0 08/26/2024    MPV 9.5 06/26/2018     Lab Results   Component Value Date/Time    HGB 12.4 (L) 08/26/2024 07:43 AM    HGB 12.3 (L) 04/29/2024 12:30 PM    HGB 14.5 05/18/2023 08:08 AM    HGB 15.1 02/12/2022 12:05 PM    HGB 14.3 01/07/2020 09:54 AM    HGB 15.2 06/26/2018 01:29 PM     Lab Results   Component Value Date     04/29/2024    K 4.2 04/29/2024     04/29/2024    CO2 27.0 04/29/2024    BUN 19 04/29/2024    CREATSERUM 1.15 04/29/2024    GLU 84 04/29/2024    CA 9.5 04/29/2024    ALKPHO 55 04/29/2024    ALT 15 04/29/2024    AST 24 04/29/2024    BILT 0.6 04/29/2024    ALB 4.6 04/29/2024    TP 7.2 04/29/2024     Lab Results   Component Value Date/Time    MULUGETA 49.6 04/29/2024 12:30 PM    MULUGETA 54 06/26/2018 01:29 PM     Lab Results   Component Value Date/Time    SAT 23 04/29/2024 12:30 PM    SAT 28 06/26/2018 01:29 PM       Impression & Plan     Anemia:    Discussed etiologies of normocytic anemia, including but not limited to nutritional deficiencies, bleeding, malabsorption, chronic disease related etc.  Given patient's history, prior normal hemoglobin and borderline ferritin, suspect possible iron deficiency. His anemia correlates with his weight loss and dietary changes which suggests nutritional deficiency.  Will obtain labs with retics, iron, ferritin, B12  and folate.  Patient will also benefit from a colonoscopy.  We will contact the patient after the results to discuss next steps and treatment options.       Thank you for the consultation request and the opportunity to participate in the care of Quintin Zayas. We will continue to follow and provide further recommendations. Please contact me for any questions or concerns.         Lorri Berman MD   Hematology Oncology

## 2024-09-04 ENCOUNTER — LAB ENCOUNTER (OUTPATIENT)
Dept: LAB | Facility: HOSPITAL | Age: 39
End: 2024-09-04
Attending: INTERNAL MEDICINE
Payer: COMMERCIAL

## 2024-09-04 DIAGNOSIS — D50.9 IRON DEFICIENCY ANEMIA, UNSPECIFIED IRON DEFICIENCY ANEMIA TYPE: ICD-10-CM

## 2024-09-04 LAB
DEPRECATED HBV CORE AB SER IA-ACNC: 44.8 NG/ML
FOLATE SERPL-MCNC: 40 NG/ML (ref 5.4–?)
HGB RETIC QN AUTO: 36.9 PG (ref 28.2–36.6)
IMM RETICS NFR: 0.05 RATIO (ref 0.1–0.3)
IRON SATN MFR SERPL: 20 %
IRON SERPL-MCNC: 80 UG/DL
RETICS # AUTO: 31.3 X10(3) UL (ref 22.5–147.5)
RETICS/RBC NFR AUTO: 0.8 %
TIBC SERPL-MCNC: 393 UG/DL (ref 250–425)
TRANSFERRIN SERPL-MCNC: 264 MG/DL (ref 215–365)
VIT B12 SERPL-MCNC: 672 PG/ML (ref 211–911)

## 2024-09-04 PROCEDURE — 84466 ASSAY OF TRANSFERRIN: CPT

## 2024-09-04 PROCEDURE — 85045 AUTOMATED RETICULOCYTE COUNT: CPT

## 2024-09-04 PROCEDURE — 82728 ASSAY OF FERRITIN: CPT

## 2024-09-04 PROCEDURE — 83540 ASSAY OF IRON: CPT

## 2024-09-04 PROCEDURE — 82607 VITAMIN B-12: CPT

## 2024-09-04 PROCEDURE — 36415 COLL VENOUS BLD VENIPUNCTURE: CPT

## 2024-09-04 PROCEDURE — 82746 ASSAY OF FOLIC ACID SERUM: CPT

## 2024-09-06 DIAGNOSIS — D50.9 IRON DEFICIENCY ANEMIA, UNSPECIFIED IRON DEFICIENCY ANEMIA TYPE: Primary | ICD-10-CM

## 2024-09-28 RX ORDER — LISINOPRIL 5 MG/1
5 TABLET ORAL DAILY
Qty: 90 TABLET | Refills: 3 | Status: SHIPPED | OUTPATIENT
Start: 2024-09-28

## 2024-09-28 RX ORDER — ROSUVASTATIN CALCIUM 10 MG/1
10 TABLET, COATED ORAL NIGHTLY
Qty: 90 TABLET | Refills: 3 | Status: SHIPPED | OUTPATIENT
Start: 2024-09-28

## 2024-09-28 RX ORDER — ESCITALOPRAM OXALATE 20 MG/1
20 TABLET ORAL DAILY
Qty: 90 TABLET | Refills: 3 | Status: SHIPPED | OUTPATIENT
Start: 2024-09-28

## 2024-11-06 ENCOUNTER — OFFICE VISIT (OUTPATIENT)
Facility: CLINIC | Age: 39
End: 2024-11-06
Payer: COMMERCIAL

## 2024-11-06 ENCOUNTER — LAB ENCOUNTER (OUTPATIENT)
Dept: LAB | Age: 39
End: 2024-11-06
Attending: INTERNAL MEDICINE
Payer: COMMERCIAL

## 2024-11-06 VITALS
SYSTOLIC BLOOD PRESSURE: 116 MMHG | DIASTOLIC BLOOD PRESSURE: 68 MMHG | WEIGHT: 177 LBS | HEIGHT: 72 IN | HEART RATE: 81 BPM | BODY MASS INDEX: 23.98 KG/M2 | OXYGEN SATURATION: 97 %

## 2024-11-06 DIAGNOSIS — D50.9 IRON DEFICIENCY ANEMIA, UNSPECIFIED IRON DEFICIENCY ANEMIA TYPE: ICD-10-CM

## 2024-11-06 DIAGNOSIS — R42 LIGHTHEADEDNESS: Primary | ICD-10-CM

## 2024-11-06 DIAGNOSIS — F33.1 MODERATE EPISODE OF RECURRENT MAJOR DEPRESSIVE DISORDER (HCC): ICD-10-CM

## 2024-11-06 DIAGNOSIS — F41.9 ANXIETY: ICD-10-CM

## 2024-11-06 LAB
BASOPHILS # BLD AUTO: 0.07 X10(3) UL (ref 0–0.2)
BASOPHILS NFR BLD AUTO: 1.2 %
DEPRECATED HBV CORE AB SER IA-ACNC: 56 NG/ML
DEPRECATED RDW RBC AUTO: 50.6 FL (ref 35.1–46.3)
EOSINOPHIL # BLD AUTO: 0.6 X10(3) UL (ref 0–0.7)
EOSINOPHIL NFR BLD AUTO: 10.6 %
ERYTHROCYTE [DISTWIDTH] IN BLOOD BY AUTOMATED COUNT: 13.7 % (ref 11–15)
HCT VFR BLD AUTO: 39.9 %
HGB BLD-MCNC: 13.1 G/DL
IMM GRANULOCYTES # BLD AUTO: 0.01 X10(3) UL (ref 0–1)
IMM GRANULOCYTES NFR BLD: 0.2 %
IRON SATN MFR SERPL: 19 %
IRON SERPL-MCNC: 78 UG/DL
LYMPHOCYTES # BLD AUTO: 1.41 X10(3) UL (ref 1–4)
LYMPHOCYTES NFR BLD AUTO: 25 %
MCH RBC QN AUTO: 32.8 PG (ref 26–34)
MCHC RBC AUTO-ENTMCNC: 32.8 G/DL (ref 31–37)
MCV RBC AUTO: 99.8 FL
MONOCYTES # BLD AUTO: 0.37 X10(3) UL (ref 0.1–1)
MONOCYTES NFR BLD AUTO: 6.6 %
NEUTROPHILS # BLD AUTO: 3.18 X10 (3) UL (ref 1.5–7.7)
NEUTROPHILS # BLD AUTO: 3.18 X10(3) UL (ref 1.5–7.7)
NEUTROPHILS NFR BLD AUTO: 56.4 %
PLATELET # BLD AUTO: 302 10(3)UL (ref 150–450)
RBC # BLD AUTO: 4 X10(6)UL
TIBC SERPL-MCNC: 402 UG/DL (ref 250–425)
TRANSFERRIN SERPL-MCNC: 270 MG/DL (ref 215–365)
WBC # BLD AUTO: 5.6 X10(3) UL (ref 4–11)

## 2024-11-06 PROCEDURE — 85025 COMPLETE CBC W/AUTO DIFF WBC: CPT

## 2024-11-06 PROCEDURE — 3008F BODY MASS INDEX DOCD: CPT | Performed by: FAMILY MEDICINE

## 2024-11-06 PROCEDURE — 3078F DIAST BP <80 MM HG: CPT | Performed by: FAMILY MEDICINE

## 2024-11-06 PROCEDURE — 36415 COLL VENOUS BLD VENIPUNCTURE: CPT

## 2024-11-06 PROCEDURE — 82728 ASSAY OF FERRITIN: CPT

## 2024-11-06 PROCEDURE — 83540 ASSAY OF IRON: CPT

## 2024-11-06 PROCEDURE — 90471 IMMUNIZATION ADMIN: CPT | Performed by: FAMILY MEDICINE

## 2024-11-06 PROCEDURE — 3074F SYST BP LT 130 MM HG: CPT | Performed by: FAMILY MEDICINE

## 2024-11-06 PROCEDURE — 90656 IIV3 VACC NO PRSV 0.5 ML IM: CPT | Performed by: FAMILY MEDICINE

## 2024-11-06 PROCEDURE — 99214 OFFICE O/P EST MOD 30 MIN: CPT | Performed by: FAMILY MEDICINE

## 2024-11-06 PROCEDURE — 84466 ASSAY OF TRANSFERRIN: CPT

## 2024-11-06 RX ORDER — ESCITALOPRAM OXALATE 10 MG/1
TABLET ORAL
Qty: 6 TABLET | Refills: 0 | Status: SHIPPED | OUTPATIENT
Start: 2024-11-06 | End: 2024-11-14

## 2024-11-06 RX ORDER — FLUOXETINE 10 MG/1
CAPSULE ORAL
Qty: 7 CAPSULE | Refills: 0 | Status: SHIPPED | OUTPATIENT
Start: 2024-11-06

## 2024-11-06 RX ORDER — FERROUS SULFATE 325(65) MG
325 TABLET, DELAYED RELEASE (ENTERIC COATED) ORAL EVERY OTHER DAY
COMMUNITY

## 2024-11-06 NOTE — PROGRESS NOTES
CC:    Chief Complaint   Patient presents with    Lightheadedness     Increasing more now and for the past month.    Anxiety/Panic attack       HPI: 39 year old male here with anxiety and lightheadedness.  Has been having more lightheadedness and anxiety for the past month.  It has increased over the last month, but now it is occurring almost daily.  Feels it more when he gets up from sitting or laying, and it gets better if he puts his head in his lap.  He sometimes feels like he may fall or faint as he will also get tingling in his fingers.  He did lower his Lisinopril from 10 mg to 5 mg after his last visit, and his blood pressure is 120/70-80 on average.   His anxiety and depression has also been worse, and he feels more lethargic and like he does not want to do anything.  He will go on walks for a few hours, but that is the only thing he wants to do.  He took the last three days off as he felt so unfocused and not like himself.   His dad is going through cancer treatments, and he may only have 6 months to live.  He is also working a stressful job in property management, and her partner's parents both have dementia.   Does not feel the Lexapro is helping anymore.   Feels he has been eating more regular meals, and has been cooking most of his meals. Also eating less processed foods.   He has been taking iron every other day.   Drinks a lot of water, and two cups of coffee a day.   He has had 1-2 panic attacks over the last month, and has been trying to calm himself, sit down, and ground himself when this happens.  He has not had a therapist in about one year.     ROS:  General:  No fever, no fatigue, no weight changes  HEENT:  Denies congestion or nasal discharge  Cardio:  No chest pain. No palpitations   Pulmonary:  No cough, no SOB  GI:  No N/V/D  :  No discharge, no dysuria, no polyuria, no hematuria  Dermatologic:  No rashes  Neuro: lightheadedness, intermittent ocular migraines   Psych: increased anxiety  with panic attacks and depression     Past Medical History:    Acute, but ill-defined, cerebrovascular disease    Not mine but mom passed away from that    Anxiety    Ongoing after stopping Venlafaxine    Cancer (HCC)    Father - lung cancer    COVID-19    Depression    Lately just feeling sad and burned out a lot    Essential hypertension    History of high cholesterol    Hyperlipidemia    Metabolic disorder    Prediabetes       Social History     Socioeconomic History    Marital status:      Spouse name: Not on file    Number of children: Not on file    Years of education: Not on file    Highest education level: Not on file   Occupational History    Not on file   Tobacco Use    Smoking status: Former     Current packs/day: 0.00     Types: Cigarettes     Quit date: 2022     Years since quittin.3    Smokeless tobacco: Current    Tobacco comments:     In the process of quitting.   Vaping Use    Vaping status: Some Days    Substances: Nicotine, THC   Substance and Sexual Activity    Alcohol use: Not Currently     Comment: ocassionally     Drug use: Yes     Frequency: 7.0 times per week     Types: Cannabis     Comment: occ    Sexual activity: Yes     Partners: Female   Other Topics Concern    Caffeine Concern No    Exercise No    Seat Belt No    Special Diet No    Stress Concern Yes     Comment: Extra anxious and panicky    Weight Concern Yes     Comment: Lost a little too much   Social History Narrative    Not on file     Social Drivers of Health     Financial Resource Strain: Not on file   Food Insecurity: Not on file   Transportation Needs: Not on file   Physical Activity: Not on file   Stress: Not on file   Social Connections: Not on file   Housing Stability: Not on file       Current Outpatient Medications   Medication Sig Dispense Refill    ferrous sulfate 325 (65 FE) MG Oral Tab EC Take 1 tablet (325 mg total) by mouth every other day.      escitalopram 10 MG Oral Tab Take 1 tablet (10 mg total)  by mouth daily for 4 days, THEN 0.5 tablets (5 mg total) daily for 4 days. Then stop and start Fluoxetine at the lowest dose. 6 tablet 0    FLUoxetine 10 MG Oral Cap Take 10 mg by mouth daily x 1 week and then increase to 20 mg by mouth daily 7 capsule 0    FLUoxetine 20 MG Oral Cap Take 10 mg by mouth daily x 1 week and then increase to 20 mg by mouth daily 90 capsule 0    rosuvastatin 10 MG Oral Tab Take 1 tablet (10 mg total) by mouth nightly. 90 tablet 3       Patient has no known allergies.      Vitals:   Vitals:    11/06/24 1332   BP: 116/68   Pulse: 81   SpO2: 97%   Weight: 177 lb (80.3 kg)   Height: 6' (1.829 m)     Vitals:    11/06/24 1417 11/06/24 1418 11/06/24 1419   Ortho BP: 114/70 110/74 112/70   Patient Position: Sitting Supine Standing   BP Location: Right arm Right arm Right arm   Orthostatic Pulse: 72 74 76           Body mass index is 24.01 kg/m².    Physical:  General:  Alert, appropriate, no acute distress   HEENT: supple, no lymphadenopathy   Cardio:  RRR, no murmurs, S1, S2  Pulmonary:  Clear bilaterally, good air entry  Dermatologic:  No rashes or lesions  Psych: normal mood and affect     Assessment and Plan: 39-year-old male here for evaluation of lightheadedness and worsening anxiety and depression.    1. Lightheadedness    -Likely due to hypotension as he has lost weight and it is eating much better  - Recommend stopping lisinopril altogether, and notify me if symptoms do not resolve or if blood pressure starts to elevate    2. Anxiety    -Anxiety and depression have worsened, and Lexapro is no longer helping  - Will taper off of Lexapro as directed and start fluoxetine at a low dose of 10 mg daily before increasing to 20 mg daily as long as tolerating well  - Follow-up with me in 2 months or sooner if concerns    3. Moderate episode of recurrent major depressive disorder (HCC)    -Plan to taper off of Lexapro and start fluoxetine as above, and follow-up in 2 months    Aleta Chau,  DO  11/06/24  1:51 PM

## 2024-11-07 ENCOUNTER — TELEPHONE (OUTPATIENT)
Dept: HEMATOLOGY/ONCOLOGY | Facility: HOSPITAL | Age: 39
End: 2024-11-07

## 2024-11-07 PROBLEM — D50.8 IRON DEFICIENCY ANEMIA SECONDARY TO INADEQUATE DIETARY IRON INTAKE: Status: ACTIVE | Noted: 2024-11-07

## 2024-11-07 NOTE — TELEPHONE ENCOUNTER
Called patient in response to labs drawn yesterday- Per Dr. Berman he is still iron deficient and she is recommending IV iron for patient. Patient is in agreement he states that he is taking PO iron every other day - he is continuing to feel very fatigued and weak.  Discussed that patient will get a call to schedule as soon as we have approval from insurance.  He will call us next week if he does not hear from us.

## 2024-12-06 ENCOUNTER — OFFICE VISIT (OUTPATIENT)
Dept: HEMATOLOGY/ONCOLOGY | Facility: HOSPITAL | Age: 39
End: 2024-12-06
Attending: INTERNAL MEDICINE
Payer: COMMERCIAL

## 2024-12-06 VITALS
HEIGHT: 72 IN | TEMPERATURE: 98 F | WEIGHT: 178 LBS | DIASTOLIC BLOOD PRESSURE: 84 MMHG | OXYGEN SATURATION: 99 % | SYSTOLIC BLOOD PRESSURE: 142 MMHG | RESPIRATION RATE: 16 BRPM | HEART RATE: 64 BPM | BODY MASS INDEX: 24.11 KG/M2

## 2024-12-06 VITALS — DIASTOLIC BLOOD PRESSURE: 75 MMHG | HEART RATE: 65 BPM | SYSTOLIC BLOOD PRESSURE: 129 MMHG

## 2024-12-06 DIAGNOSIS — D50.8 IRON DEFICIENCY ANEMIA SECONDARY TO INADEQUATE DIETARY IRON INTAKE: Primary | ICD-10-CM

## 2024-12-06 DIAGNOSIS — D50.9 IRON DEFICIENCY ANEMIA, UNSPECIFIED IRON DEFICIENCY ANEMIA TYPE: Primary | ICD-10-CM

## 2024-12-06 PROCEDURE — 96365 THER/PROPH/DIAG IV INF INIT: CPT

## 2024-12-06 PROCEDURE — 99214 OFFICE O/P EST MOD 30 MIN: CPT | Performed by: NURSE PRACTITIONER

## 2024-12-06 NOTE — PROGRESS NOTES
Hematology Oncology Clinic Note    Patient Name: Quintin Zayas   YOB: 1985   Medical Record Number: [id number]   CSN: [id number]   Consulting Physician: Lorri Berman MD    Reason for Consultation:    Iron deficiency      Interval history:    Patient reports he is doing well with no acute complaints, his only symptom currently is that he is very fatigued but this is not new.  Previously experienced some heart palpitations but this has improved since starting Prozac.  Has not had colonoscopy, denies any BRBPR or dark stools.    Denies headache, weight loss, fever/chills, vision change, sore throat, worsening SOB/WETZEL, chest pain, cough, N/V/D/C, and swelling.  Denies pain.       History of Present Illness:   Quintin Zayasis a 39 year old White male with history of HTN, HLD, anxiety referred for evaluation of anemia.   His labs show mild normocytic anemia first noted in 4/2024 with Hgb 12.3, down from baseline 14-15. Repeat labs now show stable anemia Hgb 12.4 with borderline low ferritin. He has been very tired and lightheaded lately. Denies chest pain, palpitations or dyspnea. Denies melena, hematochezia or changes in bowel movements. He has been going through a lot of stress and anxiety since his father in [place] was diagnosed with lung cancer. He has been losing a lot of weight over the past year, some of it is intentional. He eats small meals 1-2 times a day.  Not vegetarian. Drinks alcohol socially, smokes marijuana daily.    No history of GI problems or autoimmune disease. No history of abdominal surgeries. No NSAID or ASA use.       Past Medical History:  Past Medical History:    Acute, but ill-defined, cerebrovascular disease    Not mine but mom passed away from that    Anxiety    Ongoing after stopping Venlafaxine    Cancer (HCC)    Father - lung cancer    COVID-19    Depression    Lately just feeling sad and burned out a lot    Essential hypertension    History of high cholesterol     Hyperlipidemia    Metabolic disorder    Prediabetes       Past Surgical History:  No past surgical history on file.    Family Medical History:  Family History   Problem Relation Age of Onset    Heart Attack Mother     Hypertension Father     High Cholesterol Father     Cancer Father         Lung cancer     Psychosocial History:  Social History     Socioeconomic History    Marital status:      Spouse name: Not on file    Number of children: Not on file    Years of education: Not on file    Highest education level: Not on file   Occupational History    Not on file   Tobacco Use    Smoking status: Former     Current packs/day: 0.00     Types: Cigarettes     Quit date: 2022     Years since quittin.4    Smokeless tobacco: Current    Tobacco comments:     In the process of quitting.   Vaping Use    Vaping status: Some Days    Substances: Nicotine, THC   Substance and Sexual Activity    Alcohol use: Not Currently     Comment: ocassionally     Drug use: Yes     Frequency: 7.0 times per week     Types: Cannabis     Comment: occ    Sexual activity: Yes     Partners: Female   Other Topics Concern    Caffeine Concern No    Exercise No    Seat Belt No    Special Diet No    Stress Concern Yes     Comment: Extra anxious and panicky    Weight Concern Yes     Comment: Lost a little too much   Social History Narrative    Not on file     Social Drivers of Health     Financial Resource Strain: Not on file   Food Insecurity: Not on file   Transportation Needs: Not on file   Physical Activity: Not on file   Stress: Not on file   Social Connections: Not on file   Housing Stability: Not on file       Allergies:   No Known Allergies    Current Medications:    Current Outpatient Medications:     ferrous sulfate 325 (65 FE) MG Oral Tab EC, Take 1 tablet (325 mg total) by mouth every other day., Disp: , Rfl:     FLUoxetine 10 MG Oral Cap, Take 10 mg by mouth daily x 1 week and then increase to 20 mg by mouth daily, Disp: 7  capsule, Rfl: 0    FLUoxetine 20 MG Oral Cap, Take 10 mg by mouth daily x 1 week and then increase to 20 mg by mouth daily, Disp: 90 capsule, Rfl: 0    rosuvastatin 10 MG Oral Tab, Take 1 tablet (10 mg total) by mouth nightly., Disp: 90 tablet, Rfl: 3    Review of Systems:  A comprehensive 10-point ROS completed all negative unless otherwise documented in HPI.     Vital Signs:  /84 (BP Location: Left arm, Patient Position: Sitting, Cuff Size: adult)   Pulse 64   Temp 97.9 °F (36.6 °C) (Oral)   Resp 16   Ht 1.829 m (6')   Wt 80.7 kg (178 lb)   SpO2 99%   BMI 24.14 kg/m²     Wt Readings from Last 6 Encounters:   12/06/24 80.7 kg (178 lb)   11/06/24 80.3 kg (177 lb)   09/03/24 81.4 kg (179 lb 6.4 oz)   04/08/24 90.7 kg (200 lb)   03/09/23 105.7 kg (233 lb)   02/10/22 117.5 kg (259 lb)      Physical Examination:  General: Alert and oriented x 3, not in acute distress.  HEENT: Non-icteric sclera. Oropharynx is clear.   Chest: Non labored breathing.   Heart: Regular pulse.   Abdomen: Non distended.   Extremities: No edema.   Neurological: Grossly intact.   Psych/Depression: Appropriate mood and affect.     Laboratory:    Lab Results   Component Value Date    WBC 5.6 11/06/2024    RBC 4.00 (L) 11/06/2024    HGB 13.1 11/06/2024    HCT 39.9 11/06/2024    MCV 99.8 11/06/2024    MCH 32.8 11/06/2024    MCHC 32.8 11/06/2024    RDW 13.7 11/06/2024    .0 11/06/2024    MPV 9.5 06/26/2018     Lab Results   Component Value Date/Time    HGB 13.1 11/06/2024 09:02 AM    HGB 12.4 (L) 08/26/2024 07:43 AM    HGB 12.3 (L) 04/29/2024 12:30 PM    HGB 14.5 05/18/2023 08:08 AM    HGB 15.1 02/12/2022 12:05 PM    HGB 14.3 01/07/2020 09:54 AM    HGB 15.2 06/26/2018 01:29 PM     Lab Results   Component Value Date     04/29/2024    K 4.2 04/29/2024     04/29/2024    CO2 27.0 04/29/2024    BUN 19 04/29/2024    CREATSERUM 1.15 04/29/2024    GLU 84 04/29/2024    CA 9.5 04/29/2024    ALKPHO 55 04/29/2024    ALT 15  04/29/2024    AST 24 04/29/2024    BILT 0.6 04/29/2024    ALB 4.6 04/29/2024    TP 7.2 04/29/2024     Lab Results   Component Value Date/Time    MULUGETA 56 11/06/2024 09:02 AM    MULUGETA 44.8 (L) 09/04/2024 11:45 AM    MULUGETA 49.6 04/29/2024 12:30 PM    MULUGETA 54 06/26/2018 01:29 PM     Lab Results   Component Value Date/Time    SAT 19 (L) 11/06/2024 09:02 AM    SAT 20 09/04/2024 11:45 AM    SAT 23 04/29/2024 12:30 PM    SAT 28 06/26/2018 01:29 PM       Impression & Plan     Anemia:    Discussed etiologies of normocytic anemia, including but not limited to nutritional deficiencies, bleeding, malabsorption, chronic disease related etc.  Given patient's history, prior normal hemoglobin and borderline ferritin, suspect possible iron deficiency. His anemia correlates with his weight loss and dietary changes which suggests nutritional deficiency.  Will obtain labs with retics, iron, ferritin, B12 and folate.  Patient will also benefit from a colonoscopy.    -Retic, folate and VitB12 WNL  -colonoscopy not scheduled,pt encouraged to get scope done  -Iron labs showed iron deficiency without anemia, he will get one dose of IV iron today  -We will repeat labs in 3 months to assess response      DEMETRIO Thompson  Hematology/Oncology

## 2024-12-06 NOTE — PROGRESS NOTES
Pt here for iron dextran with test dose. Pt denies any new issues or concerns. Educated pt on dose, side effects and duration. Krames provided.  PIV with good blood return.  Test dose given without complaint-tolerated well.  VSS after observation.  Dose given as ordered-tolerated well.  VSS and piv dc'd.  Pt left without complaints.      Ordering Provider: Antonella Schulz Exp: completion of dose     Pt tolerated infusion without difficulty or complaint. Reviewed next apt date/time: outpatient labs and MDV in 3 months      Education Record  Learner:  Patient  Disease / Diagnosis: anemia  Barriers / Limitations:  None  Method:  Discussion  General Topics:  Medication, Side effects and symptom management, and Plan of care reviewed  Outcome:  Shows understanding

## 2025-01-05 ENCOUNTER — TELEPHONE (OUTPATIENT)
Age: 40
End: 2025-01-05

## 2025-01-05 NOTE — TELEPHONE ENCOUNTER
Hello,     The [facility] Navigation team has attempted to reach you regarding an order from Dr. Chau's office. We are reaching out in order to assist you in coordinating care and resources that may meet your needs. Please give our office a call at [phone]. For more immediate assistance you can contact our 24-hour help line at [phone]. We look forward to hearing from you soon.

## 2025-01-10 ENCOUNTER — TELEPHONE (OUTPATIENT)
Age: 40
End: 2025-01-10

## 2025-02-20 NOTE — TELEPHONE ENCOUNTER
Rx failed protocol. Medication failed due to not active on medication list.     Please review order. Previous sig take 10 mg daily x 1 week and then increase to 20 mg daily, prescribed on 11/06/2024 for 90 day supply.     Requested Prescriptions     Pending Prescriptions Disp Refills    FLUoxetine 20 MG Oral Cap [Pharmacy Med Name: FLUOXETINE 20MG CAPSULES] 90 capsule 3     Sig: Take 1 capsule (20 mg total) by mouth daily.       Sent [employer] message to patient that order is routed to provider for review.

## 2025-03-04 ENCOUNTER — TELEPHONE (OUTPATIENT)
Age: 40
End: 2025-03-04

## 2025-03-07 ENCOUNTER — TELEPHONE (OUTPATIENT)
Age: 40
End: 2025-03-07

## 2025-03-07 ENCOUNTER — APPOINTMENT (OUTPATIENT)
Age: 40
End: 2025-03-07
Attending: INTERNAL MEDICINE
Payer: COMMERCIAL

## 2025-03-07 NOTE — TELEPHONE ENCOUNTER
Called and unable to reach Mando. Left VM letting him know we got his message of cancelling today's follow up appointment with Dr. Berman. Told him to call our office to reschedule his appointment. Reminded him to complete his labs before his appointment. Provided the office phone number to call back.

## (undated) NOTE — LETTER
[address]  [address], [address] [address] [phone]     Patient: Mayte Kumar   YOB: 1985   Date of Visit: 12/29/2020     Dear Employer,        December 29, 2020    At [facility], we are [place] Persons who are asymptomatic but have been exposed, CDC recommends 14 days of quarantine after exposure based on the time it takes to develop illness if infected.  Thus, it is possible that a person known to be infected could leave isolation earlier than a

## (undated) NOTE — LETTER
Date & Time: 12/8/2022, 3:29 PM  Patient: Francois Bledsoe  Encounter Provider(s):    TRE Munson       To Whom It May Concern:    Naomi Reynolds was seen and treated in our department on 12/8/2022. He should be excused from work to return 12/12/22.     If you have any questions or concerns, please do not hesitate to call.        _____________________________  Physician/APC Signature